# Patient Record
Sex: FEMALE | Race: ASIAN | Employment: FULL TIME | URBAN - METROPOLITAN AREA
[De-identification: names, ages, dates, MRNs, and addresses within clinical notes are randomized per-mention and may not be internally consistent; named-entity substitution may affect disease eponyms.]

---

## 2017-06-02 RX ORDER — HYDROCHLOROTHIAZIDE 12.5 MG/1
CAPSULE, GELATIN COATED ORAL
Qty: 90 CAPSULE | Refills: 0 | Status: SHIPPED | OUTPATIENT
Start: 2017-06-02 | End: 2017-08-31

## 2017-06-02 NOTE — TELEPHONE ENCOUNTER
Medication failed protocol, please approve or deny  LOV- 5/3/2016 over due for follow up   Last labs - 5/6/2016

## 2017-08-31 RX ORDER — HYDROCHLOROTHIAZIDE 12.5 MG/1
CAPSULE, GELATIN COATED ORAL
Qty: 30 CAPSULE | Refills: 0 | Status: SHIPPED | OUTPATIENT
Start: 2017-08-31 | End: 2017-10-11

## 2017-08-31 NOTE — TELEPHONE ENCOUNTER
Medication failed protocol please approve or deny  LOV- 5/3/2016, over due for annual physical  Last labs- 5/3/2016  Medication last refilled- 6/2/2017 x 3 mo  No appointment scheduled

## 2017-10-13 RX ORDER — HYDROCHLOROTHIAZIDE 12.5 MG/1
CAPSULE, GELATIN COATED ORAL
Qty: 30 CAPSULE | Refills: 0 | Status: SHIPPED | OUTPATIENT
Start: 2017-10-13 | End: 2017-11-13

## 2017-10-16 DIAGNOSIS — L40.9 PSORIASIS: ICD-10-CM

## 2017-10-16 RX ORDER — BETAMETHASONE DIPROPIONATE 0.5 MG/G
1 CREAM TOPICAL 2 TIMES DAILY
Qty: 15 G | Refills: 0 | OUTPATIENT
Start: 2017-10-16 | End: 2017-10-30

## 2017-11-13 ENCOUNTER — OFFICE VISIT (OUTPATIENT)
Dept: FAMILY MEDICINE CLINIC | Facility: CLINIC | Age: 47
End: 2017-11-13

## 2017-11-13 VITALS
BODY MASS INDEX: 26.8 KG/M2 | HEART RATE: 68 BPM | TEMPERATURE: 99 F | DIASTOLIC BLOOD PRESSURE: 86 MMHG | HEIGHT: 64 IN | SYSTOLIC BLOOD PRESSURE: 128 MMHG | OXYGEN SATURATION: 99 % | RESPIRATION RATE: 20 BRPM | WEIGHT: 157 LBS

## 2017-11-13 DIAGNOSIS — Z12.31 VISIT FOR SCREENING MAMMOGRAM: ICD-10-CM

## 2017-11-13 DIAGNOSIS — Z00.00 ROUTINE GENERAL MEDICAL EXAMINATION AT A HEALTH CARE FACILITY: Primary | ICD-10-CM

## 2017-11-13 DIAGNOSIS — Z13.29 SCREENING FOR ENDOCRINE, NUTRITIONAL, METABOLIC AND IMMUNITY DISORDER: ICD-10-CM

## 2017-11-13 DIAGNOSIS — N95.2 POST-MENOPAUSE ATROPHIC VAGINITIS: ICD-10-CM

## 2017-11-13 DIAGNOSIS — Z13.21 SCREENING FOR ENDOCRINE, NUTRITIONAL, METABOLIC AND IMMUNITY DISORDER: ICD-10-CM

## 2017-11-13 DIAGNOSIS — I10 ESSENTIAL HYPERTENSION, BENIGN: ICD-10-CM

## 2017-11-13 DIAGNOSIS — L30.8 OTHER ECZEMA: ICD-10-CM

## 2017-11-13 DIAGNOSIS — Z13.0 SCREENING FOR ENDOCRINE, NUTRITIONAL, METABOLIC AND IMMUNITY DISORDER: ICD-10-CM

## 2017-11-13 DIAGNOSIS — Z13.228 SCREENING FOR ENDOCRINE, NUTRITIONAL, METABOLIC AND IMMUNITY DISORDER: ICD-10-CM

## 2017-11-13 PROCEDURE — 99396 PREV VISIT EST AGE 40-64: CPT | Performed by: FAMILY MEDICINE

## 2017-11-13 PROCEDURE — 99213 OFFICE O/P EST LOW 20 MIN: CPT | Performed by: FAMILY MEDICINE

## 2017-11-13 RX ORDER — BETAMETHASONE DIPROPIONATE 0.5 MG/G
OINTMENT TOPICAL
Qty: 50 G | Refills: 1 | Status: SHIPPED | OUTPATIENT
Start: 2017-11-13 | End: 2017-11-15

## 2017-11-13 RX ORDER — ASPIRIN 81 MG/1
81 TABLET ORAL DAILY
Qty: 30 TABLET | Refills: 11 | COMMUNITY
Start: 2017-11-13 | End: 2017-11-15

## 2017-11-13 RX ORDER — HYDROCHLOROTHIAZIDE 12.5 MG/1
12.5 CAPSULE, GELATIN COATED ORAL
Qty: 90 CAPSULE | Refills: 3 | Status: SHIPPED | OUTPATIENT
Start: 2017-11-13 | End: 2017-11-15

## 2017-11-13 NOTE — PATIENT INSTRUCTIONS
Dr. Bekah Rodriguez DDS.    Address: [facility], [address]   Phone: [phone]      Taking a Diuretic  Your healthcare provider has prescribed a diuretic, or “water pill,” to help your body get rid of excess water and salt and maint · Get up slowly when you are sitting or lying down. This helps prevent dizziness and falls due to dizziness. · Ask your healthcare provider or pharmacist before you take any other prescription or nonprescription medicine or herbal supplements.  Some of the © 4008-5676 [employer] [address]. [address] [facility], [address]. All rights reserved. This information is not intended as a substitute for professional medical care. Always follow your healthcare professional's instructions.         Eating · Managing calories. A calorie is a unit of energy. Your body burns calories for fuel, but if you eat more calories than your body burns, the extras are stored as fat. Your health care provider can help you create a diet plan to manage your calories.  This A mckeon part of healthy cooking is cutting down on added fat and salt. Look on the internet for lower-fat, lower-sodium recipes. Also, try these tips:  · Remove fat from meat and skin from poultry before cooking.   · Skim fat from the surface of soups and jean-pierre

## 2017-11-13 NOTE — PROGRESS NOTES
Dipak Martinez is a 48year old female who presents for a complete physical exam, no gyn. HPI:     Patient presents with:  Physical      Patient feels well, dental visit up to date, no hearing problem. Vaccinations up to date.     The patient complaint edema,orthopnea, no palpitations   GI: denies nausea, vomiting, constipation, diarrhea; no rectal bleeding; no heartburn  GENITAL/: no dysuria, urgency or frequency  MUSCULOSKELETAL: no joint complaints upper or lower extremities  NEURO: no sensory or mo symptoms:    Signed Prescriptions Disp Refills    Estrogens, Conjugated 0.625 MG/GM Vaginal Cream 1 Tube 6      Sig: Place 0.5 g vaginally 3 (three) times a week.          All side effects of HRT (increase risk of uterine and breast cancer) also  benefits o

## 2017-11-15 ENCOUNTER — TELEPHONE (OUTPATIENT)
Dept: FAMILY MEDICINE CLINIC | Facility: CLINIC | Age: 47
End: 2017-11-15

## 2017-11-15 DIAGNOSIS — I10 ESSENTIAL HYPERTENSION, BENIGN: ICD-10-CM

## 2017-11-15 RX ORDER — BETAMETHASONE DIPROPIONATE 0.5 MG/G
OINTMENT TOPICAL
Qty: 50 G | Refills: 1 | Status: SHIPPED | OUTPATIENT
Start: 2017-11-15 | End: 2018-12-14 | Stop reason: ALTCHOICE

## 2017-11-15 RX ORDER — ASPIRIN 81 MG/1
81 TABLET ORAL DAILY
Qty: 90 TABLET | Refills: 3 | Status: SHIPPED | OUTPATIENT
Start: 2017-11-15 | End: 2018-12-14

## 2017-11-15 RX ORDER — HYDROCHLOROTHIAZIDE 12.5 MG/1
12.5 CAPSULE, GELATIN COATED ORAL
Qty: 90 CAPSULE | Refills: 3 | Status: SHIPPED | OUTPATIENT
Start: 2017-11-15 | End: 2018-12-13

## 2017-11-15 NOTE — TELEPHONE ENCOUNTER
Pt needs refill of Hydrochlorothiazide 12.5,  Estrogens, Betamethasone dip, aspirin transferred to [address] [place] [address] Adin Ph: [phone].

## 2018-01-18 ENCOUNTER — OFFICE VISIT (OUTPATIENT)
Dept: FAMILY MEDICINE CLINIC | Facility: CLINIC | Age: 48
End: 2018-01-18

## 2018-01-18 VITALS
WEIGHT: 160 LBS | TEMPERATURE: 98 F | SYSTOLIC BLOOD PRESSURE: 130 MMHG | BODY MASS INDEX: 27 KG/M2 | OXYGEN SATURATION: 97 % | RESPIRATION RATE: 18 BRPM | DIASTOLIC BLOOD PRESSURE: 60 MMHG | HEART RATE: 87 BPM

## 2018-01-18 DIAGNOSIS — J01.00 ACUTE NON-RECURRENT MAXILLARY SINUSITIS: Primary | ICD-10-CM

## 2018-01-18 PROCEDURE — 99213 OFFICE O/P EST LOW 20 MIN: CPT | Performed by: FAMILY MEDICINE

## 2018-01-18 RX ORDER — AZITHROMYCIN 250 MG/1
TABLET, FILM COATED ORAL
Qty: 6 TABLET | Refills: 0 | Status: SHIPPED | OUTPATIENT
Start: 2018-01-18 | End: 2018-12-14 | Stop reason: ALTCHOICE

## 2018-01-18 NOTE — PROGRESS NOTES
Aicha Pierre is a 50year old female.    Patient presents with:  Sore Throat: pt c\o o fsore thhroat,headache  x3DAYS     HPI:    Symptoms started   7 days ago with purulent nasal discharge, maxillary sinus pressure, and headache, a lot of  post-nasal PLAN:       (J01.00) Acute non-recurrent maxillary sinusitis  (primary encounter diagnosis)  Plan:     Signed Prescriptions Disp Refills    azithromycin (ZITHROMAX Z-ENEDINA) 250 MG Oral Tab 6 tablet 0      Sig: Take two tablets by mouth today, then one tablet

## 2018-12-12 ENCOUNTER — TELEPHONE (OUTPATIENT)
Dept: FAMILY MEDICINE CLINIC | Facility: CLINIC | Age: 48
End: 2018-12-12

## 2018-12-12 DIAGNOSIS — I10 ESSENTIAL HYPERTENSION, BENIGN: ICD-10-CM

## 2018-12-12 RX ORDER — HYDROCHLOROTHIAZIDE 12.5 MG/1
12.5 CAPSULE, GELATIN COATED ORAL
Qty: 90 CAPSULE | Refills: 0 | OUTPATIENT
Start: 2018-12-12

## 2018-12-12 NOTE — TELEPHONE ENCOUNTER
Medication(s) to Refill:   Requested Prescriptions     Pending Prescriptions Disp Refills   • HYDROCHLOROTHIAZIDE 12.5 MG Oral Cap [Pharmacy Med Name: HYDROCHLOROTHIAZIDE 12.5MG 12.5MG C] 90 capsule 0     Sig: TAKE 1 CAPSULE (12.5 MG TOTAL) BY MOUTH ONCE D

## 2018-12-13 RX ORDER — HYDROCHLOROTHIAZIDE 12.5 MG/1
12.5 CAPSULE, GELATIN COATED ORAL
Qty: 30 CAPSULE | Refills: 0 | Status: SHIPPED | OUTPATIENT
Start: 2018-12-13 | End: 2018-12-14

## 2018-12-13 NOTE — TELEPHONE ENCOUNTER
Patient made appointment for 12/14/18 with Ngoc Vaughan for her physical and mediciton follow up. Patient has been out of medication since yesterday.

## 2018-12-14 ENCOUNTER — OFFICE VISIT (OUTPATIENT)
Dept: FAMILY MEDICINE CLINIC | Facility: CLINIC | Age: 48
End: 2018-12-14
Payer: COMMERCIAL

## 2018-12-14 ENCOUNTER — LAB ENCOUNTER (OUTPATIENT)
Dept: LAB | Age: 48
End: 2018-12-14
Attending: NURSE PRACTITIONER
Payer: COMMERCIAL

## 2018-12-14 VITALS
WEIGHT: 148 LBS | HEART RATE: 62 BPM | DIASTOLIC BLOOD PRESSURE: 68 MMHG | RESPIRATION RATE: 16 BRPM | SYSTOLIC BLOOD PRESSURE: 114 MMHG | OXYGEN SATURATION: 99 % | HEIGHT: 64 IN | TEMPERATURE: 98 F | BODY MASS INDEX: 25.27 KG/M2

## 2018-12-14 DIAGNOSIS — L72.3 SEBACEOUS CYST: ICD-10-CM

## 2018-12-14 DIAGNOSIS — L30.9 ECZEMA, UNSPECIFIED TYPE: ICD-10-CM

## 2018-12-14 DIAGNOSIS — Z13.0 SCREENING FOR ENDOCRINE, NUTRITIONAL, METABOLIC AND IMMUNITY DISORDER: ICD-10-CM

## 2018-12-14 DIAGNOSIS — Z13.21 ENCOUNTER FOR VITAMIN DEFICIENCY SCREENING: ICD-10-CM

## 2018-12-14 DIAGNOSIS — Z13.228 SCREENING FOR ENDOCRINE, NUTRITIONAL, METABOLIC AND IMMUNITY DISORDER: ICD-10-CM

## 2018-12-14 DIAGNOSIS — I10 ESSENTIAL HYPERTENSION, BENIGN: ICD-10-CM

## 2018-12-14 DIAGNOSIS — Z13.6 ENCOUNTER FOR SCREENING FOR CARDIOVASCULAR DISORDERS: ICD-10-CM

## 2018-12-14 DIAGNOSIS — Z13.21 SCREENING FOR ENDOCRINE, NUTRITIONAL, METABOLIC AND IMMUNITY DISORDER: ICD-10-CM

## 2018-12-14 DIAGNOSIS — Z00.00 ROUTINE GENERAL MEDICAL EXAMINATION AT A HEALTH CARE FACILITY: Primary | ICD-10-CM

## 2018-12-14 DIAGNOSIS — Z13.29 SCREENING FOR ENDOCRINE, NUTRITIONAL, METABOLIC AND IMMUNITY DISORDER: ICD-10-CM

## 2018-12-14 DIAGNOSIS — Z12.39 SCREENING FOR BREAST CANCER: ICD-10-CM

## 2018-12-14 PROCEDURE — 36415 COLL VENOUS BLD VENIPUNCTURE: CPT | Performed by: NURSE PRACTITIONER

## 2018-12-14 PROCEDURE — 80061 LIPID PANEL: CPT | Performed by: NURSE PRACTITIONER

## 2018-12-14 PROCEDURE — 99396 PREV VISIT EST AGE 40-64: CPT | Performed by: NURSE PRACTITIONER

## 2018-12-14 PROCEDURE — 82306 VITAMIN D 25 HYDROXY: CPT | Performed by: NURSE PRACTITIONER

## 2018-12-14 PROCEDURE — 99213 OFFICE O/P EST LOW 20 MIN: CPT | Performed by: NURSE PRACTITIONER

## 2018-12-14 PROCEDURE — 80050 GENERAL HEALTH PANEL: CPT | Performed by: NURSE PRACTITIONER

## 2018-12-14 RX ORDER — ASPIRIN 81 MG/1
81 TABLET ORAL DAILY
Qty: 90 TABLET | Refills: 3 | Status: SHIPPED | OUTPATIENT
Start: 2018-12-14 | End: 2019-02-02

## 2018-12-14 RX ORDER — CEPHALEXIN 500 MG/1
500 CAPSULE ORAL 3 TIMES DAILY
Qty: 21 CAPSULE | Refills: 0 | Status: SHIPPED | OUTPATIENT
Start: 2018-12-14 | End: 2018-12-21

## 2018-12-14 RX ORDER — BETAMETHASONE DIPROPIONATE 0.5 MG/G
OINTMENT TOPICAL
Qty: 50 G | Refills: 1 | Status: SHIPPED | OUTPATIENT
Start: 2018-12-14

## 2018-12-14 RX ORDER — HYDROCHLOROTHIAZIDE 12.5 MG/1
12.5 CAPSULE, GELATIN COATED ORAL
Qty: 90 CAPSULE | Refills: 2 | Status: SHIPPED | OUTPATIENT
Start: 2018-12-14 | End: 2019-02-07 | Stop reason: CLARIF

## 2018-12-14 NOTE — PROGRESS NOTES
[facility]   PROGRESS NOTE  Chief Complaint:   Patient presents with:  Physical      HPI:   This is a 50year old female coming in for physical     Physical: Patient reports doing well, blood pressure under control , exercising three times  per w 150.0 - 450.0 10(3)uL    MCV 89.5 81.0 - 100.0 fL    MCH 28.7 27.0 - 33.2 pg    MCHC 32.1 31.0 - 37.0 g/dL    RDW 14.1 11.5 - 16.0 %    RDW-SD 45.4 35.1 - 46.3 fL    Neutrophil Absolute Prelim 2.73 1.30 - 6.70 x10 (3) uL    Neutrophil Absolute 2.73 1.30 - sputum. GASTROINTESTINAL:  Denies abdominal pain, nausea, vomiting, constipation, diarrhea  MUSCULOSKELETAL:  Denies weakness, muscle aches, back pain, joint pain, swelling or stiffness.   NEUROLOGICAL:  Denies headache, seizures, dizziness, HEMATOLOGIC: that consists of whole grain , fruits , vegetables , lean meats . Aim for higher servings of fruits and vegetables and less fatty foods in your diet.        Screening for endocrine, nutritional, metabolic and immunity disorder  -     CBC WITH DIFFERENTIAL W or complications from the treatments as a result of today.      Problem List:  Patient Active Problem List:     Routine gynecological examination     Screening for iron deficiency anemia     Screening for lipoid disorders     Screening for thyroid disorder

## 2018-12-17 ENCOUNTER — TELEPHONE (OUTPATIENT)
Dept: FAMILY MEDICINE CLINIC | Facility: CLINIC | Age: 48
End: 2018-12-17

## 2018-12-17 NOTE — TELEPHONE ENCOUNTER
----- Message from CLARICE Infante sent at 12/15/2018 10:22 AM CST -----  Labs are good , cholesterol slightly elevated -continue low cholesterol, low fat diet , exercise

## 2019-02-02 DIAGNOSIS — I10 ESSENTIAL HYPERTENSION, BENIGN: ICD-10-CM

## 2019-02-04 RX ORDER — ASPIRIN 81 MG/1
81 TABLET ORAL DAILY
Qty: 90 TABLET | Refills: 3 | Status: SHIPPED | OUTPATIENT
Start: 2019-02-04

## 2019-02-04 NOTE — TELEPHONE ENCOUNTER
Medication(s) to Refill:   Requested Prescriptions     Pending Prescriptions Disp Refills   • aspirin 81 MG Oral Tab EC 90 tablet 3     Sig: Take 1 tablet (81 mg total) by mouth daily.          Reason for Medication Refill being sent to Provider / Reason Pr

## 2019-02-07 ENCOUNTER — OFFICE VISIT (OUTPATIENT)
Dept: FAMILY MEDICINE CLINIC | Facility: CLINIC | Age: 49
End: 2019-02-07
Payer: COMMERCIAL

## 2019-02-07 VITALS
BODY MASS INDEX: 24.59 KG/M2 | SYSTOLIC BLOOD PRESSURE: 128 MMHG | WEIGHT: 144 LBS | HEIGHT: 64 IN | RESPIRATION RATE: 16 BRPM | DIASTOLIC BLOOD PRESSURE: 86 MMHG | HEART RATE: 88 BPM | OXYGEN SATURATION: 97 % | TEMPERATURE: 98 F

## 2019-02-07 DIAGNOSIS — Z12.11 SCREENING FOR COLON CANCER: ICD-10-CM

## 2019-02-07 DIAGNOSIS — I10 ESSENTIAL HYPERTENSION: Primary | ICD-10-CM

## 2019-02-07 PROCEDURE — 99214 OFFICE O/P EST MOD 30 MIN: CPT | Performed by: FAMILY MEDICINE

## 2019-02-07 RX ORDER — LOSARTAN POTASSIUM AND HYDROCHLOROTHIAZIDE 12.5; 5 MG/1; MG/1
1 TABLET ORAL DAILY
Qty: 90 TABLET | Refills: 3 | Status: SHIPPED | OUTPATIENT
Start: 2019-02-07 | End: 2019-12-27

## 2019-02-07 NOTE — PROGRESS NOTES
Sirisha Dawkins is a 46year old female presents with HTN.     HPI:   Patient presents for high blood pressure issues. Pt has been not taking any medications, home BP monitoring in the range of  535-899  's systolic and  90 's diastolic. Pt took Phentermine edema.  GI: denies abdominal pain and denies heartburn  NEURO: denies abnormal sensation. EXAM:   /86   Pulse 88   Temp 98.1 °F (36.7 °C) (Oral)   Resp 16   Ht 64\"   Wt 144 lb   SpO2 97%   BMI 24.72 kg/m²   GEN: Pt A, Ox3, NAD, pleasant.   EYES: P

## 2019-02-07 NOTE — PATIENT INSTRUCTIONS
[facility] General Surgery:       Dr. Dollene Mohs    [address] [place] [address]  Rafi, [address]      [address]  [address]

## 2019-08-08 ENCOUNTER — TELEPHONE (OUTPATIENT)
Dept: FAMILY MEDICINE CLINIC | Facility: CLINIC | Age: 49
End: 2019-08-08

## 2019-08-08 NOTE — TELEPHONE ENCOUNTER
Patient's spouse Sharmila Wills dropped off [facility] healthworks/[employer] wellness program screening form for completion, explained to Sharmila Wills that an OSCAR would need to be completed by patient before this form could be faxed, he said to call [place]

## 2019-08-12 NOTE — TELEPHONE ENCOUNTER
Spoke with patient and informed her physical form is ready for . Patient states her  will  form. Copy sent to scan.

## 2019-08-13 ENCOUNTER — MED REC SCAN ONLY (OUTPATIENT)
Dept: FAMILY MEDICINE CLINIC | Facility: CLINIC | Age: 49
End: 2019-08-13

## 2019-08-22 ENCOUNTER — OFFICE VISIT (OUTPATIENT)
Dept: FAMILY MEDICINE CLINIC | Facility: CLINIC | Age: 49
End: 2019-08-22
Payer: COMMERCIAL

## 2019-08-22 VITALS
DIASTOLIC BLOOD PRESSURE: 80 MMHG | RESPIRATION RATE: 16 BRPM | HEIGHT: 64 IN | WEIGHT: 157 LBS | SYSTOLIC BLOOD PRESSURE: 110 MMHG | BODY MASS INDEX: 26.8 KG/M2 | OXYGEN SATURATION: 98 % | HEART RATE: 76 BPM

## 2019-08-22 DIAGNOSIS — Z12.4 SCREENING FOR CERVICAL CANCER: Primary | ICD-10-CM

## 2019-08-22 PROCEDURE — 99396 PREV VISIT EST AGE 40-64: CPT | Performed by: NURSE PRACTITIONER

## 2019-08-22 PROCEDURE — 88175 CYTOPATH C/V AUTO FLUID REDO: CPT | Performed by: NURSE PRACTITIONER

## 2019-08-22 PROCEDURE — 87624 HPV HI-RISK TYP POOLED RSLT: CPT | Performed by: NURSE PRACTITIONER

## 2019-08-22 NOTE — PROGRESS NOTES
CHIEF COMPLAINT:         HPI:   Alfredo Moreland is a 46year old female who presents for a complete physical exam.     LMP: 3 years ago. Menopause.       Last Pap: 25 y result: normal  Sexually active: yes -decreased due to menopause  Contraception us allergies or asthma    EXAM:   /80   Pulse 76   Resp 16   Ht 64\"   Wt 157 lb   SpO2 98%   BMI 26.95 kg/m²   Body mass index is 26.95 kg/m².      GENERAL: well developed, in no apparent distress  SKIN: no rashes  HEENT: atraumatic, normocephalic,ears

## 2019-08-22 NOTE — PATIENT INSTRUCTIONS
Pap Test     A speculum is used to open the vagina so cells can be taken from the cervix. Schedule your test for a time when you will not be having your menstrual period.  If you’re menstruating at the time of your appointment, call your healthcare prov · Normal result. The cells in the sample appear healthy. Have your next Pap test as recommended by your healthcare provider. · Abnormal result. The lab saw something unusual in your sample. Talk with your healthcare provider about what the results mean.  Chel Elise

## 2019-08-23 LAB — HPV I/H RISK 1 DNA SPEC QL NAA+PROBE: NEGATIVE

## 2019-08-27 LAB — LAST PAP RESULT: NORMAL

## 2019-09-03 ENCOUNTER — HOSPITAL ENCOUNTER (OUTPATIENT)
Dept: MAMMOGRAPHY | Age: 49
Discharge: HOME | End: 2019-09-03
Attending: NURSE PRACTITIONER
Payer: COMMERCIAL

## 2019-09-03 ENCOUNTER — OFFICE VISIT (OUTPATIENT)
Dept: SURGERY | Facility: CLINIC | Age: 49
End: 2019-09-03

## 2019-09-03 VITALS
HEIGHT: 64 IN | DIASTOLIC BLOOD PRESSURE: 81 MMHG | HEART RATE: 67 BPM | BODY MASS INDEX: 27.31 KG/M2 | SYSTOLIC BLOOD PRESSURE: 120 MMHG | WEIGHT: 160 LBS | RESPIRATION RATE: 18 BRPM

## 2019-09-03 DIAGNOSIS — I10 ESSENTIAL HYPERTENSION, BENIGN: ICD-10-CM

## 2019-09-03 DIAGNOSIS — Z12.11 ENCOUNTER FOR SCREENING COLONOSCOPY: Primary | ICD-10-CM

## 2019-09-03 DIAGNOSIS — Z12.39 SCREENING FOR BREAST CANCER: ICD-10-CM

## 2019-09-03 PROCEDURE — 77063 BREAST TOMOSYNTHESIS BI: CPT | Performed by: NURSE PRACTITIONER

## 2019-09-03 PROCEDURE — 77067 SCR MAMMO BI INCL CAD: CPT | Performed by: NURSE PRACTITIONER

## 2019-09-03 PROCEDURE — S0285 CNSLT BEFORE SCREEN COLONOSC: HCPCS | Performed by: COLON & RECTAL SURGERY

## 2019-09-03 RX ORDER — POLYETHYLENE GLYCOL 3350, SODIUM CHLORIDE, SODIUM BICARBONATE, POTASSIUM CHLORIDE 420; 11.2; 5.72; 1.48 G/4L; G/4L; G/4L; G/4L
POWDER, FOR SOLUTION ORAL
Qty: 1 BOTTLE | Refills: 0 | Status: SHIPPED | OUTPATIENT
Start: 2019-09-03 | End: 2021-07-09 | Stop reason: ALTCHOICE

## 2019-09-03 NOTE — H&P
New Patient Visit Note       Active Problems      1.  Encounter for screening colonoscopy        Chief Complaint   Patient presents with:  Colonoscopy: NEW PT, COLONOSCOPY CONSULT-pt deniers family hx of colon cancer, denies rectal bleeding, or other symtom listed diagnoses and treatment options. Allergies  Teri has No Known Allergies. Past Medical / Surgical / Social / Family History    The past medical and past surgical history have been reviewed by me today.     Past Medical History:   Diagnosis Merrill Eyes: Pupils are equal, round, and reactive to light. Conjunctivae are normal. No scleral icterus. Neck: Trachea normal and normal range of motion. Neck supple. No JVD present. Carotid bruit is not present. No tracheal deviation present.  No thyroid mas and dry. She is not diaphoretic. Psychiatric: She has a normal mood and affect. Her behavior is normal. Thought content normal.   Nursing note and vitals reviewed.           Assessment   Encounter for screening colonoscopy  (primary encounter diagnosis)

## 2019-09-03 NOTE — PATIENT INSTRUCTIONS
I am seeing this patient at the request the primary care service in consultation regarding colonoscopy. This patient has a grandmother who has had cancer of the uterus. She has no blood in the stool or melena.   She has no mucus in the stool or narrow

## 2019-09-16 ENCOUNTER — MED REC SCAN ONLY (OUTPATIENT)
Dept: FAMILY MEDICINE CLINIC | Facility: CLINIC | Age: 49
End: 2019-09-16

## 2019-10-03 ENCOUNTER — PATIENT MESSAGE (OUTPATIENT)
Dept: FAMILY MEDICINE CLINIC | Facility: CLINIC | Age: 49
End: 2019-10-03

## 2019-10-03 ENCOUNTER — TELEPHONE (OUTPATIENT)
Dept: FAMILY MEDICINE CLINIC | Facility: CLINIC | Age: 49
End: 2019-10-03

## 2019-10-03 NOTE — TELEPHONE ENCOUNTER
From: Mehul Gomez  To: Jesse Tai MD  Sent: 10/3/2019 10:26 AM CDT  Subject: Test Results Question    the following test results were requested by [facility] ,please send to them as soon as possible,it was due 9/30/2019 and I thought the request was s

## 2019-10-03 NOTE — TELEPHONE ENCOUNTER
Patient is looking for results of her cologard, she needs to submit a copy of it to her insurance. Pt will be sending a My Chart message with fax info, she indicated that she has already signed OSCAR for that.

## 2019-10-03 NOTE — TELEPHONE ENCOUNTER
From: Papa Ingram  To:  CLARICE Kelly  Sent: 10/3/2019 9:51 AM CDT  Subject: Test Results Question    I need to submit ALL wellness test results to Jack ,I have submitted my cologuard 9/11/2019 and donot see the test results ,please send me a co

## 2019-10-03 NOTE — TELEPHONE ENCOUNTER
From: Radha Armstrong  To:  CLARICE Ken  Sent: 10/3/2019 10:28 AM CDT  Subject: Test Results Question    the following test results were requested by [facility] ,please send to them as soon as possible,it was due 9/30/2019 and I thought the request was

## 2019-10-28 ENCOUNTER — PATIENT MESSAGE (OUTPATIENT)
Dept: FAMILY MEDICINE CLINIC | Facility: CLINIC | Age: 49
End: 2019-10-28

## 2019-10-28 ENCOUNTER — TELEPHONE (OUTPATIENT)
Dept: FAMILY MEDICINE CLINIC | Facility: CLINIC | Age: 49
End: 2019-10-28

## 2019-10-28 NOTE — TELEPHONE ENCOUNTER
Patient is wondering if we received a form from [facility] \[facility]\"" to fill out with her physical info for insurance.

## 2019-10-29 NOTE — TELEPHONE ENCOUNTER
From: Masood Brewer  To:  Amanda Méndez MD  Sent: 10/28/2019 8:26 PM CDT  Subject: Visit Follow-up Question    i have attached the form needed by [facility] ,they are saying they have NOT received it and will kick me out of insurance on 11/ if they do

## 2019-10-29 NOTE — TELEPHONE ENCOUNTER
From: Alf Cleaning  To: CLARICE Regalado  Sent: 10/28/2019 8:40 PM CDT  Subject: Visit Follow-up Question    have attached the form needed by [facility] ,they are saying they have NOT received it and will kick me out of insurance on 11/ if they don

## 2019-11-08 ENCOUNTER — LAB ENCOUNTER (OUTPATIENT)
Dept: LAB | Age: 49
End: 2019-11-08
Attending: FAMILY MEDICINE
Payer: COMMERCIAL

## 2019-11-08 ENCOUNTER — OFFICE VISIT (OUTPATIENT)
Dept: FAMILY MEDICINE CLINIC | Facility: CLINIC | Age: 49
End: 2019-11-08
Payer: COMMERCIAL

## 2019-11-08 VITALS
OXYGEN SATURATION: 99 % | TEMPERATURE: 99 F | SYSTOLIC BLOOD PRESSURE: 126 MMHG | WEIGHT: 153 LBS | RESPIRATION RATE: 20 BRPM | HEART RATE: 68 BPM | BODY MASS INDEX: 26.12 KG/M2 | DIASTOLIC BLOOD PRESSURE: 80 MMHG | HEIGHT: 64 IN

## 2019-11-08 DIAGNOSIS — H81.13 BENIGN PAROXYSMAL POSITIONAL VERTIGO DUE TO BILATERAL VESTIBULAR DISORDER: Primary | ICD-10-CM

## 2019-11-08 DIAGNOSIS — H81.13 BENIGN PAROXYSMAL POSITIONAL VERTIGO DUE TO BILATERAL VESTIBULAR DISORDER: ICD-10-CM

## 2019-11-08 PROCEDURE — 80053 COMPREHEN METABOLIC PANEL: CPT | Performed by: FAMILY MEDICINE

## 2019-11-08 PROCEDURE — 36415 COLL VENOUS BLD VENIPUNCTURE: CPT | Performed by: FAMILY MEDICINE

## 2019-11-08 PROCEDURE — 85025 COMPLETE CBC W/AUTO DIFF WBC: CPT | Performed by: FAMILY MEDICINE

## 2019-11-08 PROCEDURE — 99214 OFFICE O/P EST MOD 30 MIN: CPT | Performed by: FAMILY MEDICINE

## 2019-11-08 RX ORDER — MECLIZINE HCL 12.5 MG/1
12.5 TABLET ORAL 3 TIMES DAILY PRN
Qty: 40 TABLET | Refills: 0 | Status: SHIPPED | OUTPATIENT
Start: 2019-11-08 | End: 2019-11-17

## 2019-11-08 NOTE — PROGRESS NOTES
Patient presents with:  Nausea: Rt Arm pain        HPI:      The patient has complaints of dizziness.  Pt reports vertigo, \"room is spinning around symptoms\",any head movement makes it worse started suddenly:few weeks ago pt took \"whole bottle of Motrin\ soft, NT/ND with no HSM. No rebound or guarding, NABS. Extremities: are symmetric with no cyanosis, clubbing, or edema. Skin: is unremarkable without rashes. Back: has normal curves, no scoliosis.    Lymph: no lymphadenopathy of the neck, axillary

## 2019-11-10 ENCOUNTER — PATIENT MESSAGE (OUTPATIENT)
Dept: FAMILY MEDICINE CLINIC | Facility: CLINIC | Age: 49
End: 2019-11-10

## 2019-11-11 NOTE — TELEPHONE ENCOUNTER
From: Radha Armstrong  To: Eleni Martines MD  Sent: 11/10/2019 6:00 PM CST  Subject: Test Results Question    recvd results of cbc ,what about cmp?  thanks

## 2019-11-17 DIAGNOSIS — H81.13 BENIGN PAROXYSMAL POSITIONAL VERTIGO DUE TO BILATERAL VESTIBULAR DISORDER: ICD-10-CM

## 2019-11-18 RX ORDER — MECLIZINE HCL 12.5 MG/1
TABLET ORAL
Qty: 40 TABLET | Refills: 0 | Status: SHIPPED | OUTPATIENT
Start: 2019-11-18 | End: 2021-01-19

## 2019-11-18 NOTE — TELEPHONE ENCOUNTER
Medication(s) to Refill:   Requested Prescriptions     Pending Prescriptions Disp Refills   • MECLIZINE HCL 12.5 MG Oral Tab [Pharmacy Med Name: Meclizine Hcl 12.5 Mg Tab Rugb] 40 tablet 0     Sig: Take 1 tablet by mouth 3 (three) times daily as needed.

## 2019-12-27 ENCOUNTER — TELEPHONE (OUTPATIENT)
Dept: FAMILY MEDICINE CLINIC | Facility: CLINIC | Age: 49
End: 2019-12-27

## 2019-12-27 ENCOUNTER — PATIENT MESSAGE (OUTPATIENT)
Dept: FAMILY MEDICINE CLINIC | Facility: CLINIC | Age: 49
End: 2019-12-27

## 2019-12-27 RX ORDER — HYDROCHLOROTHIAZIDE 12.5 MG/1
12.5 TABLET ORAL DAILY
Qty: 90 TABLET | Refills: 1 | Status: SHIPPED | OUTPATIENT
Start: 2019-12-27 | End: 2020-05-21

## 2019-12-27 RX ORDER — LOSARTAN POTASSIUM 50 MG/1
50 TABLET ORAL DAILY
Qty: 90 TABLET | Refills: 1 | Status: SHIPPED | OUTPATIENT
Start: 2019-12-27 | End: 2020-05-21

## 2019-12-27 NOTE — TELEPHONE ENCOUNTER
Received fax from [facility] that the losartan-hctz 50/12.5mg is still on back order so if the script could be sent as 2 separate scripts.     completed

## 2019-12-30 RX ORDER — OSELTAMIVIR PHOSPHATE 75 MG/1
75 CAPSULE ORAL 2 TIMES DAILY
Qty: 10 CAPSULE | Refills: 0 | Status: SHIPPED | OUTPATIENT
Start: 2019-12-30 | End: 2020-01-04

## 2019-12-30 NOTE — TELEPHONE ENCOUNTER
Please see pt e-mail. Pt's symptoms started 12/27. LOV 11/8/19. Please advise on Tamiflu orders for pt.

## 2019-12-30 NOTE — TELEPHONE ENCOUNTER
From: Mehul Gomez  To:  Rehana Encinas MD  Sent: 12/27/2019 8:49 PM CST  Subject: Prescription Question    My  has the flu  And saw Dr Hermann Casarez yesterday   He’s on tamiflu  My son Yuliet Cisse and daughter Carina Reese are still coughing they had the fl

## 2020-05-03 ENCOUNTER — PATIENT MESSAGE (OUTPATIENT)
Dept: FAMILY MEDICINE CLINIC | Facility: CLINIC | Age: 50
End: 2020-05-03

## 2020-05-04 ENCOUNTER — VIRTUAL PHONE E/M (OUTPATIENT)
Dept: FAMILY MEDICINE CLINIC | Facility: CLINIC | Age: 50
End: 2020-05-04
Payer: COMMERCIAL

## 2020-05-04 DIAGNOSIS — Z20.822 CLOSE EXPOSURE TO COVID-19 VIRUS: Primary | ICD-10-CM

## 2020-05-04 PROCEDURE — 99213 OFFICE O/P EST LOW 20 MIN: CPT | Performed by: FAMILY MEDICINE

## 2020-05-04 NOTE — PROGRESS NOTES
HPI:    Patient ID: Mehul Gomez is a 44year old female. Mehul Gomez verbally consents to a Virtual/Telephone Check-In service on 05/04/20. CC: Exposured to Covid 19.     HPI  Pt exposed at work, no issues, no fever, no chills, no coush, no SO

## 2020-05-04 NOTE — TELEPHONE ENCOUNTER
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 I got over the extreme tiredness last thursday.  No coughing no fever no diarrhea no o

## 2020-05-19 RX ORDER — LOSARTAN POTASSIUM 50 MG/1
50 TABLET ORAL DAILY
Qty: 90 TABLET | Refills: 1 | OUTPATIENT
Start: 2020-05-19

## 2020-05-19 RX ORDER — HYDROCHLOROTHIAZIDE 12.5 MG/1
12.5 TABLET ORAL DAILY
Qty: 90 TABLET | Refills: 1 | OUTPATIENT
Start: 2020-05-19

## 2020-05-21 RX ORDER — LOSARTAN POTASSIUM 50 MG/1
50 TABLET ORAL DAILY
Qty: 90 TABLET | Refills: 1 | Status: SHIPPED | OUTPATIENT
Start: 2020-05-21 | End: 2020-12-22

## 2020-05-21 RX ORDER — HYDROCHLOROTHIAZIDE 12.5 MG/1
12.5 TABLET ORAL DAILY
Qty: 90 TABLET | Refills: 1 | Status: SHIPPED | OUTPATIENT
Start: 2020-05-21 | End: 2020-12-22

## 2020-05-21 NOTE — TELEPHONE ENCOUNTER
Pharmacy is requesting refill, they did not received script that was sent 12/27/19.  Please approve or deny

## 2020-06-26 ENCOUNTER — PATIENT MESSAGE (OUTPATIENT)
Dept: FAMILY MEDICINE CLINIC | Facility: CLINIC | Age: 50
End: 2020-06-26

## 2020-06-26 RX ORDER — ACETAMINOPHEN AND CODEINE PHOSPHATE 300; 30 MG/1; MG/1
1-2 TABLET ORAL EVERY 4 HOURS PRN
Qty: 20 TABLET | Refills: 0 | Status: SHIPPED | OUTPATIENT
Start: 2020-06-26 | End: 2020-07-06

## 2020-06-26 RX ORDER — CHLORHEXIDINE GLUCONATE 0.12 MG/ML
15 RINSE ORAL 2 TIMES DAILY
Qty: 473 ML | Refills: 0 | Status: SHIPPED | OUTPATIENT
Start: 2020-06-26 | End: 2020-07-10

## 2020-06-26 NOTE — TELEPHONE ENCOUNTER
Please see pt e-mail. LOV 5/4 virtual for Close COVID Exposure without symptoms. Please advise on orders for pt.

## 2020-06-26 NOTE — TELEPHONE ENCOUNTER
From: Brian Garcia  To:  Shelly Jordan MD  Sent: 6/26/2020 9:46 AM CDT  Subject: Other    I have a canker sore at the side of my tongue towards my right cheek  Very close to my throat   I think there us more than one i cannot see it but it us causing

## 2020-06-26 NOTE — TELEPHONE ENCOUNTER
Augustus Gipson MD  You 2 minutes ago (10:27 AM)      We can send Tylenol 3 and some solution, I can do it, let her knwo if she is interested

## 2020-08-26 ENCOUNTER — PATIENT MESSAGE (OUTPATIENT)
Dept: FAMILY MEDICINE CLINIC | Facility: CLINIC | Age: 50
End: 2020-08-26

## 2020-08-26 NOTE — TELEPHONE ENCOUNTER
From: Sirisha Dawkins  To: Hemal Dickson MD  Sent: 8/26/2020 8:51 AM CDT  Subject: Other    continued-  prophylactic antibiotics ? he is otherwise healthy,prepares his own meals ,he exercises daily and is into fitness .   thank you ,i would appreciate fe

## 2020-12-22 RX ORDER — HYDROCHLOROTHIAZIDE 12.5 MG/1
12.5 TABLET ORAL DAILY
Qty: 90 TABLET | Refills: 0 | Status: SHIPPED | OUTPATIENT
Start: 2020-12-22 | End: 2021-01-19

## 2020-12-22 RX ORDER — LOSARTAN POTASSIUM 50 MG/1
50 TABLET ORAL DAILY
Qty: 90 TABLET | Refills: 0 | Status: SHIPPED | OUTPATIENT
Start: 2020-12-22 | End: 2021-01-19

## 2020-12-22 NOTE — TELEPHONE ENCOUNTER
Medication(s) to Refill:   Requested Prescriptions     Pending Prescriptions Disp Refills   • HYDROCHLOROTHIAZIDE 12.5 MG Oral Tab [Pharmacy Med Name: Hydrochlorothiazide 12.5 Mg Tab Acco] 90 tablet 0     Sig: Take 1 tablet (12.5 mg total) by mouth daily.

## 2021-01-19 DIAGNOSIS — H81.13 BENIGN PAROXYSMAL POSITIONAL VERTIGO DUE TO BILATERAL VESTIBULAR DISORDER: ICD-10-CM

## 2021-01-20 RX ORDER — MECLIZINE HCL 12.5 MG/1
12.5 TABLET ORAL 3 TIMES DAILY PRN
Qty: 40 TABLET | Refills: 0 | Status: SHIPPED | OUTPATIENT
Start: 2021-01-20 | End: 2021-07-09

## 2021-01-20 RX ORDER — HYDROCHLOROTHIAZIDE 12.5 MG/1
12.5 TABLET ORAL DAILY
Qty: 90 TABLET | Refills: 0 | Status: SHIPPED | OUTPATIENT
Start: 2021-01-20 | End: 2021-07-02

## 2021-01-20 RX ORDER — LOSARTAN POTASSIUM 50 MG/1
50 TABLET ORAL DAILY
Qty: 90 TABLET | Refills: 0 | Status: SHIPPED | OUTPATIENT
Start: 2021-01-20 | End: 2021-07-02

## 2021-01-20 NOTE — TELEPHONE ENCOUNTER
Medication(s) to Refill:   Requested Prescriptions     Pending Prescriptions Disp Refills   • Meclizine HCl 12.5 MG Oral Tab 40 tablet 0     Sig: Take 1 tablet (12.5 mg total) by mouth 3 (three) times daily as needed.    • hydrochlorothiazide 12.5 MG Oral T

## 2021-01-22 ENCOUNTER — TELEMEDICINE (OUTPATIENT)
Dept: FAMILY MEDICINE CLINIC | Facility: CLINIC | Age: 51
End: 2021-01-22

## 2021-01-22 ENCOUNTER — PATIENT MESSAGE (OUTPATIENT)
Dept: FAMILY MEDICINE CLINIC | Facility: CLINIC | Age: 51
End: 2021-01-22

## 2021-01-22 ENCOUNTER — LAB ENCOUNTER (OUTPATIENT)
Dept: LAB | Age: 51
End: 2021-01-22
Attending: FAMILY MEDICINE
Payer: COMMERCIAL

## 2021-01-22 DIAGNOSIS — Z12.31 VISIT FOR SCREENING MAMMOGRAM: ICD-10-CM

## 2021-01-22 DIAGNOSIS — R10.13 EPIGASTRIC PAIN: Primary | ICD-10-CM

## 2021-01-22 DIAGNOSIS — R10.13 EPIGASTRIC PAIN: ICD-10-CM

## 2021-01-22 LAB
ALBUMIN SERPL-MCNC: 4.3 G/DL (ref 3.4–5)
ALBUMIN/GLOB SERPL: 1 {RATIO} (ref 1–2)
ALP LIVER SERPL-CCNC: 69 U/L
ALT SERPL-CCNC: 31 U/L
ANION GAP SERPL CALC-SCNC: 6 MMOL/L (ref 0–18)
AST SERPL-CCNC: 24 U/L (ref 15–37)
BASOPHILS # BLD AUTO: 0.06 X10(3) UL (ref 0–0.2)
BASOPHILS NFR BLD AUTO: 1 %
BILIRUB SERPL-MCNC: 0.3 MG/DL (ref 0.1–2)
BUN BLD-MCNC: 20 MG/DL (ref 7–18)
BUN/CREAT SERPL: 24.1 (ref 10–20)
CALCIUM BLD-MCNC: 10 MG/DL (ref 8.5–10.1)
CHLORIDE SERPL-SCNC: 101 MMOL/L (ref 98–112)
CO2 SERPL-SCNC: 28 MMOL/L (ref 21–32)
CREAT BLD-MCNC: 0.83 MG/DL
DEPRECATED RDW RBC AUTO: 38.5 FL (ref 35.1–46.3)
EOSINOPHIL # BLD AUTO: 0.09 X10(3) UL (ref 0–0.7)
EOSINOPHIL NFR BLD AUTO: 1.5 %
ERYTHROCYTE [DISTWIDTH] IN BLOOD BY AUTOMATED COUNT: 11.9 % (ref 11–15)
GLOBULIN PLAS-MCNC: 4.2 G/DL (ref 2.8–4.4)
GLUCOSE BLD-MCNC: 95 MG/DL (ref 70–99)
HCT VFR BLD AUTO: 38.8 %
HGB BLD-MCNC: 12.8 G/DL
IMM GRANULOCYTES # BLD AUTO: 0.01 X10(3) UL (ref 0–1)
IMM GRANULOCYTES NFR BLD: 0.2 %
LYMPHOCYTES # BLD AUTO: 1.68 X10(3) UL (ref 1–4)
LYMPHOCYTES NFR BLD AUTO: 28.5 %
M PROTEIN MFR SERPL ELPH: 8.5 G/DL (ref 6.4–8.2)
MCH RBC QN AUTO: 29.4 PG (ref 26–34)
MCHC RBC AUTO-ENTMCNC: 33 G/DL (ref 31–37)
MCV RBC AUTO: 89.2 FL
MONOCYTES # BLD AUTO: 0.38 X10(3) UL (ref 0.1–1)
MONOCYTES NFR BLD AUTO: 6.5 %
NEUTROPHILS # BLD AUTO: 3.67 X10 (3) UL (ref 1.5–7.7)
NEUTROPHILS # BLD AUTO: 3.67 X10(3) UL (ref 1.5–7.7)
NEUTROPHILS NFR BLD AUTO: 62.3 %
OSMOLALITY SERPL CALC.SUM OF ELEC: 282 MOSM/KG (ref 275–295)
PATIENT FASTING Y/N/NP: NO
PLATELET # BLD AUTO: 281 10(3)UL (ref 150–450)
POTASSIUM SERPL-SCNC: 3.9 MMOL/L (ref 3.5–5.1)
RBC # BLD AUTO: 4.35 X10(6)UL
SODIUM SERPL-SCNC: 135 MMOL/L (ref 136–145)
TSI SER-ACNC: 1.72 MIU/ML (ref 0.36–3.74)
WBC # BLD AUTO: 5.9 X10(3) UL (ref 4–11)

## 2021-01-22 PROCEDURE — 99214 OFFICE O/P EST MOD 30 MIN: CPT | Performed by: FAMILY MEDICINE

## 2021-01-22 PROCEDURE — 80050 GENERAL HEALTH PANEL: CPT | Performed by: FAMILY MEDICINE

## 2021-01-22 PROCEDURE — 83013 H PYLORI (C-13) BREATH: CPT | Performed by: FAMILY MEDICINE

## 2021-01-22 PROCEDURE — 36415 COLL VENOUS BLD VENIPUNCTURE: CPT | Performed by: FAMILY MEDICINE

## 2021-01-22 NOTE — PROGRESS NOTES
Claudia Chen is a 44year old female who presents for stomachache. HPI    The patient complaints of abdominal pain. Pain is located at epigastrium. Pain is described as sharp, some radiation to the back.  Mild to moderate, worse with mels and better • Unspecified essential hypertension       Past Surgical History:   Procedure Laterality Date   •      • TUBAL LIGATION        Family History   Problem Relation Age of Onset   • Other (Other) Father    • Other (lung cancer) Father    • Hypertension M

## 2021-01-24 LAB — H. PYLORI BREATH TEST: NEGATIVE

## 2021-01-25 ENCOUNTER — TELEPHONE (OUTPATIENT)
Dept: FAMILY MEDICINE CLINIC | Facility: CLINIC | Age: 51
End: 2021-01-25

## 2021-01-25 RX ORDER — PANTOPRAZOLE SODIUM 40 MG/1
40 TABLET, DELAYED RELEASE ORAL
Qty: 90 TABLET | Refills: 0 | Status: SHIPPED | OUTPATIENT
Start: 2021-01-25

## 2021-01-25 NOTE — TELEPHONE ENCOUNTER
Please see pt e-mail with continued GI symptoms & advise on next step since H-Pylori & labs are normal. Thank you.

## 2021-01-25 NOTE — TELEPHONE ENCOUNTER
----- Message from Keshawn Hercules MD sent at 1/25/2021  9:52 AM CST -----  Normal, ok to send Pantoprazole 40mg po every day q am, low spicy diet, if not better in 30 days will do referral or imaging thanks.

## 2021-01-25 NOTE — TELEPHONE ENCOUNTER
From: Reymundo Govea  Sent: 1/24/2021 8:20 PM CST  To: [facility] Clinical Staff  Subject: RE: Visit Follow-up Question    Thank you  I see the results are negative   I did mention to dr that i took pepcid and tums  My symptoms are not better -today i took 2

## 2021-07-02 RX ORDER — HYDROCHLOROTHIAZIDE 12.5 MG/1
12.5 TABLET ORAL DAILY
Qty: 30 TABLET | Refills: 0 | Status: SHIPPED | OUTPATIENT
Start: 2021-07-02 | End: 2021-08-02

## 2021-07-02 RX ORDER — LOSARTAN POTASSIUM 50 MG/1
50 TABLET ORAL DAILY
Qty: 30 TABLET | Refills: 0 | Status: SHIPPED | OUTPATIENT
Start: 2021-07-02 | End: 2021-08-02

## 2021-07-09 ENCOUNTER — OFFICE VISIT (OUTPATIENT)
Dept: FAMILY MEDICINE CLINIC | Facility: CLINIC | Age: 51
End: 2021-07-09
Payer: COMMERCIAL

## 2021-07-09 VITALS
OXYGEN SATURATION: 99 % | SYSTOLIC BLOOD PRESSURE: 126 MMHG | TEMPERATURE: 98 F | WEIGHT: 164 LBS | DIASTOLIC BLOOD PRESSURE: 76 MMHG | RESPIRATION RATE: 16 BRPM | BODY MASS INDEX: 28 KG/M2 | HEIGHT: 64 IN | HEART RATE: 77 BPM

## 2021-07-09 DIAGNOSIS — I10 ESSENTIAL HYPERTENSION, BENIGN: ICD-10-CM

## 2021-07-09 DIAGNOSIS — Z12.31 ENCOUNTER FOR SCREENING MAMMOGRAM FOR BREAST CANCER: ICD-10-CM

## 2021-07-09 DIAGNOSIS — Z00.00 LABORATORY EXAMINATION ORDERED AS PART OF A ROUTINE GENERAL MEDICAL EXAMINATION: ICD-10-CM

## 2021-07-09 DIAGNOSIS — Z23 NEED FOR TDAP VACCINATION: ICD-10-CM

## 2021-07-09 DIAGNOSIS — Z00.00 WELLNESS EXAMINATION: Primary | ICD-10-CM

## 2021-07-09 PROCEDURE — 90471 IMMUNIZATION ADMIN: CPT | Performed by: FAMILY MEDICINE

## 2021-07-09 PROCEDURE — 3074F SYST BP LT 130 MM HG: CPT | Performed by: FAMILY MEDICINE

## 2021-07-09 PROCEDURE — 99396 PREV VISIT EST AGE 40-64: CPT | Performed by: FAMILY MEDICINE

## 2021-07-09 PROCEDURE — 3008F BODY MASS INDEX DOCD: CPT | Performed by: FAMILY MEDICINE

## 2021-07-09 PROCEDURE — 90715 TDAP VACCINE 7 YRS/> IM: CPT | Performed by: FAMILY MEDICINE

## 2021-07-09 PROCEDURE — 3078F DIAST BP <80 MM HG: CPT | Performed by: FAMILY MEDICINE

## 2021-07-09 NOTE — PROGRESS NOTES
HPI:     Orest Habermann is a 44year old female who presents for an Annual Health Visit. HTN - stable on meds. No acute concerns or complaints.      Allergies:   No Known Allergies    CURRENT MEDICATIONS:   Current Outpatient Medications   Medicat (1.626 m)   Wt 164 lb (74.4 kg)   SpO2 99%   BMI 28.15 kg/m²    Wt Readings from Last 6 Encounters:  07/09/21 : 164 lb (74.4 kg)  11/08/19 : 153 lb (69.4 kg)  09/03/19 : 160 lb (72.6 kg)  08/22/19 : 157 lb (71.2 kg)  02/07/19 : 144 lb (65.3 kg)  12/14/18 : routine general medical examination  -     CBC WITH DIFFERENTIAL WITH PLATELET; Future  -     COMP METABOLIC PANEL (14); Future  -     LIPID PANEL;  Future  -     TSH W REFLEX TO FREE T4; Future    Encounter for screening mammogram for breast cancer  - age group  Yearly checkup if your blood pressure is normal   Normal blood pressure is less than 120/80 mm Hg   If your blood pressure reading is higher than normal, follow the advice of your healthcare provider    Breast cancer All women at average risk in triglycerides  All women in this age group who are at risk for coronary artery disease  At least every 5 years; talk with your healthcare provider about your risk    HIV All women At least once during your lifetime; yearly if at high risk    Lung cancer Wo a year   Measles, mumps, rubella (MMR)  Women in this age group born in [birth year] or later who have no record of these infections or vaccines  1 or 2doses   Meningococcal Women at increased risk for infection  1 or more doses; talk with your healthcare provider reviewed this educational content on 6/1/2020  © 9628-2888 [employer]. All rights reserved. This information is not intended as a substitute for professional medical care. Always follow your healthcare professional's instructions.

## 2021-07-09 NOTE — PATIENT INSTRUCTIONS
Prevention Guidelines, Women Ages 48 to 59  Screening tests and vaccines are an important part of managing your health. A screening test is done to find possible disorders or diseases in people who don't have any symptoms.  The goal is to find a disease e have had a complete hysterectomy  Pap test every 3 years or Pap test with human papillomavirus (HPV) test every 5 years    Chlamydia Women who are sexually active and at increased risk for infection  At yearly routine exams    Colorectal cancer All women a with your healthcare provider for more information.     Obesity All women in this age group  At yearly routine exams    Osteoporosis Women who are postmenopausal  Talk with your healthcare provider    Syphilis Women at increased risk for infection  At [facility] Tetanus/diphtheria/pertussis (Td/Tdap) booster All women in this age group  Td every 10 years, or a 1-time dose of Tdap instead of a Td booster after age 25, then Td every 10 years    Recombinant zoster vaccine ([facility])  All women ages 48 and older  If 2 do

## 2021-07-10 ENCOUNTER — LAB ENCOUNTER (OUTPATIENT)
Dept: LAB | Age: 51
End: 2021-07-10
Attending: FAMILY MEDICINE
Payer: COMMERCIAL

## 2021-07-10 DIAGNOSIS — Z00.00 LABORATORY EXAMINATION ORDERED AS PART OF A ROUTINE GENERAL MEDICAL EXAMINATION: ICD-10-CM

## 2021-07-10 LAB
ALBUMIN SERPL-MCNC: 4.3 G/DL (ref 3.4–5)
ALBUMIN/GLOB SERPL: 1.1 {RATIO} (ref 1–2)
ALP LIVER SERPL-CCNC: 63 U/L
ALT SERPL-CCNC: 39 U/L
ANION GAP SERPL CALC-SCNC: 4 MMOL/L (ref 0–18)
AST SERPL-CCNC: 20 U/L (ref 15–37)
BASOPHILS # BLD AUTO: 0.04 X10(3) UL (ref 0–0.2)
BASOPHILS NFR BLD AUTO: 0.8 %
BILIRUB SERPL-MCNC: 0.3 MG/DL (ref 0.1–2)
BUN BLD-MCNC: 13 MG/DL (ref 7–18)
BUN/CREAT SERPL: 17.6 (ref 10–20)
CALCIUM BLD-MCNC: 9.5 MG/DL (ref 8.5–10.1)
CHLORIDE SERPL-SCNC: 106 MMOL/L (ref 98–112)
CHOLEST SMN-MCNC: 224 MG/DL (ref ?–200)
CO2 SERPL-SCNC: 28 MMOL/L (ref 21–32)
CREAT BLD-MCNC: 0.74 MG/DL
DEPRECATED RDW RBC AUTO: 42.1 FL (ref 35.1–46.3)
EOSINOPHIL # BLD AUTO: 0.1 X10(3) UL (ref 0–0.7)
EOSINOPHIL NFR BLD AUTO: 2 %
ERYTHROCYTE [DISTWIDTH] IN BLOOD BY AUTOMATED COUNT: 12.9 % (ref 11–15)
GLOBULIN PLAS-MCNC: 3.8 G/DL (ref 2.8–4.4)
GLUCOSE BLD-MCNC: 101 MG/DL (ref 70–99)
HCT VFR BLD AUTO: 38.4 %
HDLC SERPL-MCNC: 48 MG/DL (ref 40–59)
HGB BLD-MCNC: 12 G/DL
IMM GRANULOCYTES # BLD AUTO: 0.01 X10(3) UL (ref 0–1)
IMM GRANULOCYTES NFR BLD: 0.2 %
LDLC SERPL CALC-MCNC: 146 MG/DL (ref ?–100)
LYMPHOCYTES # BLD AUTO: 1.31 X10(3) UL (ref 1–4)
LYMPHOCYTES NFR BLD AUTO: 25.7 %
M PROTEIN MFR SERPL ELPH: 8.1 G/DL (ref 6.4–8.2)
MCH RBC QN AUTO: 28.3 PG (ref 26–34)
MCHC RBC AUTO-ENTMCNC: 31.3 G/DL (ref 31–37)
MCV RBC AUTO: 90.6 FL
MONOCYTES # BLD AUTO: 0.37 X10(3) UL (ref 0.1–1)
MONOCYTES NFR BLD AUTO: 7.3 %
NEUTROPHILS # BLD AUTO: 3.26 X10 (3) UL (ref 1.5–7.7)
NEUTROPHILS # BLD AUTO: 3.26 X10(3) UL (ref 1.5–7.7)
NEUTROPHILS NFR BLD AUTO: 64 %
NONHDLC SERPL-MCNC: 176 MG/DL (ref ?–130)
OSMOLALITY SERPL CALC.SUM OF ELEC: 286 MOSM/KG (ref 275–295)
PATIENT FASTING Y/N/NP: YES
PATIENT FASTING Y/N/NP: YES
PLATELET # BLD AUTO: 256 10(3)UL (ref 150–450)
POTASSIUM SERPL-SCNC: 4.2 MMOL/L (ref 3.5–5.1)
RBC # BLD AUTO: 4.24 X10(6)UL
SODIUM SERPL-SCNC: 138 MMOL/L (ref 136–145)
TRIGL SERPL-MCNC: 165 MG/DL (ref 30–149)
TSI SER-ACNC: 1.93 MIU/ML (ref 0.36–3.74)
VLDLC SERPL CALC-MCNC: 31 MG/DL (ref 0–30)
WBC # BLD AUTO: 5.1 X10(3) UL (ref 4–11)

## 2021-07-10 PROCEDURE — 80050 GENERAL HEALTH PANEL: CPT | Performed by: FAMILY MEDICINE

## 2021-07-10 PROCEDURE — 80061 LIPID PANEL: CPT | Performed by: FAMILY MEDICINE

## 2021-07-13 ENCOUNTER — TELEPHONE (OUTPATIENT)
Dept: FAMILY MEDICINE CLINIC | Facility: CLINIC | Age: 51
End: 2021-07-13

## 2021-07-13 NOTE — TELEPHONE ENCOUNTER
----- Message from jJ Baugh MD sent at 7/12/2021  4:11 PM CDT -----  Annual labs reviewed. 1. Fasting blood sugar mildly elevated -f ollow low carb diet, regular exercise.    2. Hyperlipidemia - 10yr ASCVD 2.6% - no need for statin at this ti

## 2021-08-02 RX ORDER — HYDROCHLOROTHIAZIDE 12.5 MG/1
TABLET ORAL
Qty: 90 TABLET | Refills: 0 | Status: SHIPPED | OUTPATIENT
Start: 2021-08-02 | End: 2021-10-22

## 2021-08-02 RX ORDER — LOSARTAN POTASSIUM 50 MG/1
50 TABLET ORAL DAILY
Qty: 90 TABLET | Refills: 0 | Status: SHIPPED | OUTPATIENT
Start: 2021-08-02 | End: 2021-10-22

## 2021-10-22 RX ORDER — LOSARTAN POTASSIUM 50 MG/1
TABLET ORAL
Qty: 90 TABLET | Refills: 0 | Status: SHIPPED | OUTPATIENT
Start: 2021-10-22 | End: 2022-01-28

## 2021-10-22 RX ORDER — HYDROCHLOROTHIAZIDE 12.5 MG/1
TABLET ORAL
Qty: 90 TABLET | Refills: 0 | Status: SHIPPED | OUTPATIENT
Start: 2021-10-22 | End: 2022-01-28

## 2021-12-15 ENCOUNTER — OFFICE VISIT (OUTPATIENT)
Dept: FAMILY MEDICINE CLINIC | Facility: CLINIC | Age: 51
End: 2021-12-15
Payer: COMMERCIAL

## 2021-12-15 VITALS
TEMPERATURE: 98 F | HEART RATE: 75 BPM | HEIGHT: 64 IN | SYSTOLIC BLOOD PRESSURE: 120 MMHG | DIASTOLIC BLOOD PRESSURE: 86 MMHG | OXYGEN SATURATION: 98 % | BODY MASS INDEX: 28.17 KG/M2 | RESPIRATION RATE: 18 BRPM | WEIGHT: 165 LBS

## 2021-12-15 DIAGNOSIS — Z20.822 SUSPECTED COVID-19 VIRUS INFECTION: Primary | ICD-10-CM

## 2021-12-15 PROCEDURE — 3079F DIAST BP 80-89 MM HG: CPT | Performed by: NURSE PRACTITIONER

## 2021-12-15 PROCEDURE — 99213 OFFICE O/P EST LOW 20 MIN: CPT | Performed by: NURSE PRACTITIONER

## 2021-12-15 PROCEDURE — 3008F BODY MASS INDEX DOCD: CPT | Performed by: NURSE PRACTITIONER

## 2021-12-15 PROCEDURE — 3074F SYST BP LT 130 MM HG: CPT | Performed by: NURSE PRACTITIONER

## 2021-12-16 NOTE — PROGRESS NOTES
CHIEF COMPLAINT:   Patient presents with: Allergies: x 3 days, cough      HPI:   Cristina Santo is a 44year old female who presents for upper respiratory symptoms for  3 days. Patient reports congestion, dry cough.  Symptoms have been worsening since ons intact  EARS: TM's without erythema, no bulging, noretraction,no fluid, bony landmarks visible  NOSE: Nostrils patent, clear nasal discharge, nasal mucosa pink and mildly inflamed   THROAT: Oral mucosa pink, moist. Posterior pharynx is not erythematous.  no

## 2022-01-03 ENCOUNTER — PATIENT MESSAGE (OUTPATIENT)
Dept: FAMILY MEDICINE CLINIC | Facility: CLINIC | Age: 52
End: 2022-01-03

## 2022-01-03 NOTE — TELEPHONE ENCOUNTER
From: Sarah Keene  To: Harley Kamara MD  Sent: 1/3/2022 10:56 AM CST  Subject: Symptoms    Hi  I am completely covid and flu vaccinated  And covid boosted  Covid Booster and Flu shots were given day apart-10/31 and 11/1.   After 2-3 weeks started hav

## 2022-01-03 NOTE — TELEPHONE ENCOUNTER
PSR could someone please help this pt schedule an appointment with an available provider. In office preferable for this. Thanks.

## 2022-01-28 RX ORDER — HYDROCHLOROTHIAZIDE 12.5 MG/1
TABLET ORAL
Qty: 90 TABLET | Refills: 0 | Status: SHIPPED | OUTPATIENT
Start: 2022-01-28

## 2022-01-28 RX ORDER — LOSARTAN POTASSIUM 50 MG/1
TABLET ORAL
Qty: 90 TABLET | Refills: 0 | Status: SHIPPED | OUTPATIENT
Start: 2022-01-28

## 2022-02-16 ENCOUNTER — OFFICE VISIT (OUTPATIENT)
Dept: FAMILY MEDICINE CLINIC | Facility: CLINIC | Age: 52
End: 2022-02-16
Payer: COMMERCIAL

## 2022-02-16 ENCOUNTER — LAB ENCOUNTER (OUTPATIENT)
Dept: LAB | Age: 52
End: 2022-02-16
Attending: FAMILY MEDICINE
Payer: COMMERCIAL

## 2022-02-16 VITALS
HEIGHT: 64 IN | OXYGEN SATURATION: 97 % | WEIGHT: 165 LBS | HEART RATE: 75 BPM | DIASTOLIC BLOOD PRESSURE: 84 MMHG | RESPIRATION RATE: 16 BRPM | SYSTOLIC BLOOD PRESSURE: 126 MMHG | BODY MASS INDEX: 28.17 KG/M2

## 2022-02-16 DIAGNOSIS — I10 PRIMARY HYPERTENSION: ICD-10-CM

## 2022-02-16 DIAGNOSIS — Z13.228 SCREENING FOR ENDOCRINE, NUTRITIONAL, METABOLIC AND IMMUNITY DISORDER: ICD-10-CM

## 2022-02-16 DIAGNOSIS — Z13.0 SCREENING FOR ENDOCRINE, NUTRITIONAL, METABOLIC AND IMMUNITY DISORDER: ICD-10-CM

## 2022-02-16 DIAGNOSIS — Z13.21 SCREENING FOR ENDOCRINE, NUTRITIONAL, METABOLIC AND IMMUNITY DISORDER: ICD-10-CM

## 2022-02-16 DIAGNOSIS — R07.2 PRECORDIAL PAIN: ICD-10-CM

## 2022-02-16 DIAGNOSIS — Z13.29 SCREENING FOR ENDOCRINE, NUTRITIONAL, METABOLIC AND IMMUNITY DISORDER: ICD-10-CM

## 2022-02-16 DIAGNOSIS — R06.02 SOB (SHORTNESS OF BREATH): ICD-10-CM

## 2022-02-16 DIAGNOSIS — R06.02 SOB (SHORTNESS OF BREATH): Primary | ICD-10-CM

## 2022-02-16 LAB
ALBUMIN SERPL-MCNC: 4.3 G/DL (ref 3.4–5)
ALBUMIN/GLOB SERPL: 1 {RATIO} (ref 1–2)
ALP LIVER SERPL-CCNC: 65 U/L
ALT SERPL-CCNC: 46 U/L
ANION GAP SERPL CALC-SCNC: 9 MMOL/L (ref 0–18)
AST SERPL-CCNC: 31 U/L (ref 15–37)
BASOPHILS # BLD AUTO: 0.07 X10(3) UL (ref 0–0.2)
BASOPHILS NFR BLD AUTO: 1.4 %
BILIRUB SERPL-MCNC: 0.3 MG/DL (ref 0.1–2)
BUN BLD-MCNC: 16 MG/DL (ref 7–18)
CALCIUM BLD-MCNC: 9.5 MG/DL (ref 8.5–10.1)
CHLORIDE SERPL-SCNC: 101 MMOL/L (ref 98–112)
CHOLEST SERPL-MCNC: 239 MG/DL (ref ?–200)
CO2 SERPL-SCNC: 25 MMOL/L (ref 21–32)
CREAT BLD-MCNC: 0.84 MG/DL
D DIMER PPP FEU-MCNC: <0.27 UG/ML FEU (ref ?–0.53)
EOSINOPHIL # BLD AUTO: 0.1 X10(3) UL (ref 0–0.7)
EOSINOPHIL NFR BLD AUTO: 2 %
ERYTHROCYTE [DISTWIDTH] IN BLOOD BY AUTOMATED COUNT: 12.4 %
FASTING PATIENT LIPID ANSWER: NO
GLOBULIN PLAS-MCNC: 4.3 G/DL (ref 2.8–4.4)
GLUCOSE BLD-MCNC: 98 MG/DL (ref 70–99)
HCT VFR BLD AUTO: 38.4 %
HDLC SERPL-MCNC: 47 MG/DL (ref 40–59)
HGB BLD-MCNC: 12.8 G/DL
IMM GRANULOCYTES # BLD AUTO: 0.01 X10(3) UL (ref 0–1)
IMM GRANULOCYTES NFR BLD: 0.2 %
LDLC SERPL CALC-MCNC: 137 MG/DL (ref ?–100)
LYMPHOCYTES NFR BLD AUTO: 27.2 %
MCH RBC QN AUTO: 30.2 PG (ref 26–34)
MCHC RBC AUTO-ENTMCNC: 33.3 G/DL (ref 31–37)
MCV RBC AUTO: 90.6 FL
MONOCYTES # BLD AUTO: 0.4 X10(3) UL (ref 0.1–1)
MONOCYTES NFR BLD AUTO: 7.8 %
NEUTROPHILS # BLD AUTO: 3.14 X10 (3) UL (ref 1.5–7.7)
NEUTROPHILS # BLD AUTO: 3.14 X10(3) UL (ref 1.5–7.7)
NEUTROPHILS NFR BLD AUTO: 61.4 %
NONHDLC SERPL-MCNC: 192 MG/DL (ref ?–130)
OSMOLALITY SERPL CALC.SUM OF ELEC: 281 MOSM/KG (ref 275–295)
PLATELET # BLD AUTO: 264 10(3)UL (ref 150–450)
POTASSIUM SERPL-SCNC: 3.9 MMOL/L (ref 3.5–5.1)
PROT SERPL-MCNC: 8.6 G/DL (ref 6.4–8.2)
RBC # BLD AUTO: 4.24 X10(6)UL
SODIUM SERPL-SCNC: 135 MMOL/L (ref 136–145)
TRIGL SERPL-MCNC: 307 MG/DL (ref 30–149)
TROPONIN I HIGH SENSITIVITY: <3 NG/L
TSI SER-ACNC: 1.98 MIU/ML (ref 0.36–3.74)
VIT D+METAB SERPL-MCNC: 41.6 NG/ML (ref 30–100)
VLDLC SERPL CALC-MCNC: 58 MG/DL (ref 0–30)
WBC # BLD AUTO: 5.1 X10(3) UL (ref 4–11)

## 2022-02-16 PROCEDURE — 80061 LIPID PANEL: CPT | Performed by: FAMILY MEDICINE

## 2022-02-16 PROCEDURE — 80050 GENERAL HEALTH PANEL: CPT | Performed by: FAMILY MEDICINE

## 2022-02-16 PROCEDURE — 84484 ASSAY OF TROPONIN QUANT: CPT | Performed by: FAMILY MEDICINE

## 2022-02-16 PROCEDURE — 3074F SYST BP LT 130 MM HG: CPT | Performed by: FAMILY MEDICINE

## 2022-02-16 PROCEDURE — 3079F DIAST BP 80-89 MM HG: CPT | Performed by: FAMILY MEDICINE

## 2022-02-16 PROCEDURE — 93000 ELECTROCARDIOGRAM COMPLETE: CPT | Performed by: FAMILY MEDICINE

## 2022-02-16 PROCEDURE — 99214 OFFICE O/P EST MOD 30 MIN: CPT | Performed by: FAMILY MEDICINE

## 2022-02-16 PROCEDURE — 82306 VITAMIN D 25 HYDROXY: CPT | Performed by: FAMILY MEDICINE

## 2022-02-16 PROCEDURE — 85379 FIBRIN DEGRADATION QUANT: CPT | Performed by: FAMILY MEDICINE

## 2022-02-16 PROCEDURE — 3008F BODY MASS INDEX DOCD: CPT | Performed by: FAMILY MEDICINE

## 2022-02-16 RX ORDER — BETAMETHASONE DIPROPIONATE 0.5 MG/G
OINTMENT TOPICAL
Qty: 50 G | Refills: 1 | Status: SHIPPED | OUTPATIENT
Start: 2022-02-16

## 2022-02-16 RX ORDER — PANTOPRAZOLE SODIUM 40 MG/1
40 TABLET, DELAYED RELEASE ORAL
Qty: 90 TABLET | Refills: 3 | Status: SHIPPED | OUTPATIENT
Start: 2022-02-16

## 2022-02-16 NOTE — PATIENT INSTRUCTIONS
[facility]     [address] [place] [address].    Troy Sandra   Ph: [phone]   Fax: [phone]

## 2022-02-17 ENCOUNTER — PATIENT MESSAGE (OUTPATIENT)
Dept: FAMILY MEDICINE CLINIC | Facility: CLINIC | Age: 52
End: 2022-02-17

## 2022-02-17 ENCOUNTER — TELEPHONE (OUTPATIENT)
Dept: FAMILY MEDICINE CLINIC | Facility: CLINIC | Age: 52
End: 2022-02-17

## 2022-02-17 NOTE — TELEPHONE ENCOUNTER
----- Message from Delaney Blevins MD sent at 2/17/2022  3:36 PM CST -----  Looks good, ok to take OTC fish oil for elevated TGL.

## 2022-02-25 ENCOUNTER — TELEPHONE (OUTPATIENT)
Dept: FAMILY MEDICINE CLINIC | Facility: CLINIC | Age: 52
End: 2022-02-25

## 2022-02-25 ENCOUNTER — HOSPITAL ENCOUNTER (OUTPATIENT)
Dept: CV DIAGNOSTICS | Age: 52
Discharge: HOME | End: 2022-02-25
Attending: FAMILY MEDICINE
Payer: COMMERCIAL

## 2022-02-25 DIAGNOSIS — I10 PRIMARY HYPERTENSION: ICD-10-CM

## 2022-02-25 DIAGNOSIS — R07.2 PRECORDIAL PAIN: ICD-10-CM

## 2022-02-25 PROCEDURE — 93306 TTE W/DOPPLER COMPLETE: CPT | Performed by: FAMILY MEDICINE

## 2022-03-02 ENCOUNTER — TELEPHONE (OUTPATIENT)
Dept: FAMILY MEDICINE CLINIC | Facility: CLINIC | Age: 52
End: 2022-03-02

## 2022-03-02 ENCOUNTER — HOSPITAL ENCOUNTER (OUTPATIENT)
Dept: CT IMAGING | Age: 52
Discharge: HOME | End: 2022-03-02
Attending: FAMILY MEDICINE

## 2022-03-02 DIAGNOSIS — I10 PRIMARY HYPERTENSION: ICD-10-CM

## 2022-03-02 DIAGNOSIS — R07.2 PRECORDIAL PAIN: ICD-10-CM

## 2022-03-02 NOTE — TELEPHONE ENCOUNTER
----- Message from Timbo Bello MD sent at 3/2/2022  1:29 PM CST -----  Small lung nodule, 12 months CT f/u.

## 2022-03-03 ENCOUNTER — PATIENT MESSAGE (OUTPATIENT)
Dept: FAMILY MEDICINE CLINIC | Facility: CLINIC | Age: 52
End: 2022-03-03

## 2022-03-03 NOTE — TELEPHONE ENCOUNTER
From: Tam Godwin  To: Katja Villegas MD  Sent: 3/3/2022 3:43 PM CST  Subject: Question regarding CT Scan Chest    Hi Dr  Since this test was truly a heart scan  Is there anything definitive test   Regarding the 2 nodules seen? Just because of my sob and smoking history  Fmh of cancer dad passed away lung ca and was smoker   I just wanted to be sure that there is no malignancy?     Thank you  Very much

## 2022-03-03 NOTE — TELEPHONE ENCOUNTER
Please see pt e-mail. Pt concerned about nodules seen on CT scoring test & is asking if anything else should be done besides repeat CT in 1 yr.

## 2022-03-04 ENCOUNTER — PATIENT MESSAGE (OUTPATIENT)
Dept: FAMILY MEDICINE CLINIC | Facility: CLINIC | Age: 52
End: 2022-03-04

## 2022-03-04 ENCOUNTER — TELEPHONE (OUTPATIENT)
Dept: FAMILY MEDICINE CLINIC | Facility: CLINIC | Age: 52
End: 2022-03-04

## 2022-03-04 NOTE — TELEPHONE ENCOUNTER
Called pt and discussed that CT calcium scoring test has two parts of results. Pt advised to f/u with pulm for nodule and informed that a mychart message was just sent. Questions answered and pt verbalized understanding.

## 2022-03-04 NOTE — TELEPHONE ENCOUNTER
Message from Dr. Sree Ryan:   Jorge Wilcox to see Tristian Garcia for second opinion     Ciro Damon MD   Pulmonology   Office number: [phone]   [facility]

## 2022-03-04 NOTE — TELEPHONE ENCOUNTER
From: Emre CHU  To: Sapphirenavi Calebjamari  Sent: 3/4/2022 9:34 AM CST  Subject: CT calcium scoring result     Good Morning Dr. Zachery Williamson has reviewed your CT calcium scoring result and states you had normal results. If you have any questions please contact the office. Thank you and have a good day.

## 2022-03-16 ENCOUNTER — HOSPITAL ENCOUNTER (OUTPATIENT)
Dept: CT IMAGING | Age: 52
Discharge: HOME | End: 2022-03-16
Attending: INTERNAL MEDICINE
Payer: COMMERCIAL

## 2022-03-16 DIAGNOSIS — R91.8 PULMONARY NODULES: ICD-10-CM

## 2022-03-16 PROCEDURE — 71250 CT THORAX DX C-: CPT | Performed by: INTERNAL MEDICINE

## 2022-03-30 ENCOUNTER — HOSPITAL ENCOUNTER (OUTPATIENT)
Dept: MAMMOGRAPHY | Age: 52
Discharge: HOME | End: 2022-03-30
Attending: FAMILY MEDICINE
Payer: COMMERCIAL

## 2022-03-30 ENCOUNTER — TELEPHONE (OUTPATIENT)
Dept: FAMILY MEDICINE CLINIC | Facility: CLINIC | Age: 52
End: 2022-03-30

## 2022-03-30 DIAGNOSIS — Z12.31 ENCOUNTER FOR SCREENING MAMMOGRAM FOR BREAST CANCER: ICD-10-CM

## 2022-03-30 PROCEDURE — 77063 BREAST TOMOSYNTHESIS BI: CPT | Performed by: FAMILY MEDICINE

## 2022-03-30 PROCEDURE — 77067 SCR MAMMO BI INCL CAD: CPT | Performed by: FAMILY MEDICINE

## 2022-03-30 NOTE — TELEPHONE ENCOUNTER
----- Message from Claire Fox MD sent at 3/30/2022  9:09 AM CDT -----  Normal mammogram, repeat annually.

## 2022-04-25 RX ORDER — HYDROCHLOROTHIAZIDE 12.5 MG/1
TABLET ORAL
Qty: 90 TABLET | Refills: 0 | Status: SHIPPED | OUTPATIENT
Start: 2022-04-25

## 2022-04-25 RX ORDER — LOSARTAN POTASSIUM 50 MG/1
TABLET ORAL
Qty: 90 TABLET | Refills: 0 | Status: SHIPPED | OUTPATIENT
Start: 2022-04-25

## 2022-06-17 ENCOUNTER — HOSPITAL ENCOUNTER (OUTPATIENT)
Dept: CT IMAGING | Age: 52
Discharge: HOME | End: 2022-06-17
Attending: INTERNAL MEDICINE
Payer: COMMERCIAL

## 2022-06-17 DIAGNOSIS — R91.8 PULMONARY NODULES: ICD-10-CM

## 2022-06-17 PROCEDURE — 71250 CT THORAX DX C-: CPT | Performed by: INTERNAL MEDICINE

## 2022-07-01 RX ORDER — HYDROCHLOROTHIAZIDE 12.5 MG/1
TABLET ORAL
Qty: 90 TABLET | Refills: 0 | Status: SHIPPED | OUTPATIENT
Start: 2022-07-01

## 2022-07-01 RX ORDER — LOSARTAN POTASSIUM 50 MG/1
TABLET ORAL
Qty: 90 TABLET | Refills: 0 | Status: SHIPPED | OUTPATIENT
Start: 2022-07-01

## 2022-07-26 ENCOUNTER — PATIENT MESSAGE (OUTPATIENT)
Dept: FAMILY MEDICINE CLINIC | Facility: CLINIC | Age: 52
End: 2022-07-26

## 2022-07-26 DIAGNOSIS — Z13.21 SCREENING FOR ENDOCRINE, NUTRITIONAL, METABOLIC AND IMMUNITY DISORDER: ICD-10-CM

## 2022-07-26 DIAGNOSIS — Z13.29 SCREENING FOR ENDOCRINE, NUTRITIONAL, METABOLIC AND IMMUNITY DISORDER: ICD-10-CM

## 2022-07-26 DIAGNOSIS — Z00.00 ROUTINE GENERAL MEDICAL EXAMINATION AT A HEALTH CARE FACILITY: ICD-10-CM

## 2022-07-26 DIAGNOSIS — Z13.220 SCREENING FOR LIPOID DISORDERS: ICD-10-CM

## 2022-07-26 DIAGNOSIS — Z13.228 SCREENING FOR ENDOCRINE, NUTRITIONAL, METABOLIC AND IMMUNITY DISORDER: ICD-10-CM

## 2022-07-26 DIAGNOSIS — Z13.0 SCREENING FOR IRON DEFICIENCY ANEMIA: Primary | ICD-10-CM

## 2022-07-26 DIAGNOSIS — Z13.29 SCREENING FOR THYROID DISORDER: ICD-10-CM

## 2022-07-26 DIAGNOSIS — Z13.0 SCREENING FOR ENDOCRINE, NUTRITIONAL, METABOLIC AND IMMUNITY DISORDER: ICD-10-CM

## 2022-07-26 NOTE — TELEPHONE ENCOUNTER
From: Marcelo Daugherty  To: Jelena Wright MD  Sent: 7/26/2022 1:45 PM CDT  Subject: Fabian Roy  I will be seeing you on Friday for my annual visit  I was wondeting if i can get labs before i see you? Or should i wait? I am hoping my blood sugars are good but with my family history and just recently drinking and voiding a lot is a concern. .  Hate getting old.   Let me know what you think i was hoping to have numbers before i see you-if u want me to wait i will    Thank you and see you

## 2022-07-27 NOTE — TELEPHONE ENCOUNTER
Vianca Bonner MD  [facility] Clinical Staff 33 minutes ago (2:19 PM)     LP    Ok for all screening labs including vit.  D and HgbA1C thanks, she can do it tomorrow

## 2022-07-28 ENCOUNTER — LAB ENCOUNTER (OUTPATIENT)
Dept: LAB | Age: 52
End: 2022-07-28
Attending: FAMILY MEDICINE
Payer: COMMERCIAL

## 2022-07-28 DIAGNOSIS — Z13.0 SCREENING FOR IRON DEFICIENCY ANEMIA: ICD-10-CM

## 2022-07-28 DIAGNOSIS — Z13.228 SCREENING FOR ENDOCRINE, NUTRITIONAL, METABOLIC AND IMMUNITY DISORDER: ICD-10-CM

## 2022-07-28 DIAGNOSIS — Z13.21 SCREENING FOR ENDOCRINE, NUTRITIONAL, METABOLIC AND IMMUNITY DISORDER: ICD-10-CM

## 2022-07-28 DIAGNOSIS — Z13.29 SCREENING FOR ENDOCRINE, NUTRITIONAL, METABOLIC AND IMMUNITY DISORDER: ICD-10-CM

## 2022-07-28 DIAGNOSIS — Z13.29 SCREENING FOR THYROID DISORDER: ICD-10-CM

## 2022-07-28 DIAGNOSIS — Z13.0 SCREENING FOR ENDOCRINE, NUTRITIONAL, METABOLIC AND IMMUNITY DISORDER: ICD-10-CM

## 2022-07-28 DIAGNOSIS — Z13.220 SCREENING FOR LIPOID DISORDERS: ICD-10-CM

## 2022-07-28 LAB
ALBUMIN SERPL-MCNC: 4.6 G/DL (ref 3.4–5)
ALBUMIN/GLOB SERPL: 1.1 {RATIO} (ref 1–2)
ALP LIVER SERPL-CCNC: 77 U/L
ALT SERPL-CCNC: 44 U/L
ANION GAP SERPL CALC-SCNC: 7 MMOL/L (ref 0–18)
AST SERPL-CCNC: 27 U/L (ref 15–37)
BASOPHILS # BLD AUTO: 0.05 X10(3) UL (ref 0–0.2)
BASOPHILS NFR BLD AUTO: 0.9 %
BILIRUB SERPL-MCNC: 0.4 MG/DL (ref 0.1–2)
BUN BLD-MCNC: 15 MG/DL (ref 7–18)
CALCIUM BLD-MCNC: 9.8 MG/DL (ref 8.5–10.1)
CHLORIDE SERPL-SCNC: 99 MMOL/L (ref 98–112)
CHOLEST SERPL-MCNC: 230 MG/DL (ref ?–200)
CO2 SERPL-SCNC: 26 MMOL/L (ref 21–32)
CREAT BLD-MCNC: 0.74 MG/DL
EOSINOPHIL # BLD AUTO: 0.03 X10(3) UL (ref 0–0.7)
EOSINOPHIL NFR BLD AUTO: 0.5 %
ERYTHROCYTE [DISTWIDTH] IN BLOOD BY AUTOMATED COUNT: 12.9 %
EST. AVERAGE GLUCOSE BLD GHB EST-MCNC: 126 MG/DL (ref 68–126)
FASTING PATIENT LIPID ANSWER: YES
FASTING STATUS PATIENT QL REPORTED: YES
GLOBULIN PLAS-MCNC: 4.1 G/DL (ref 2.8–4.4)
GLUCOSE BLD-MCNC: 104 MG/DL (ref 70–99)
HBA1C MFR BLD: 6 % (ref ?–5.7)
HCT VFR BLD AUTO: 39.5 %
HDLC SERPL-MCNC: 59 MG/DL (ref 40–59)
HGB BLD-MCNC: 13.2 G/DL
IMM GRANULOCYTES # BLD AUTO: 0.01 X10(3) UL (ref 0–1)
IMM GRANULOCYTES NFR BLD: 0.2 %
LDLC SERPL CALC-MCNC: 155 MG/DL (ref ?–100)
LYMPHOCYTES # BLD AUTO: 1.33 X10(3) UL (ref 1–4)
LYMPHOCYTES NFR BLD AUTO: 23.1 %
MCH RBC QN AUTO: 30.5 PG (ref 26–34)
MCHC RBC AUTO-ENTMCNC: 33.4 G/DL (ref 31–37)
MCV RBC AUTO: 91.2 FL
MONOCYTES # BLD AUTO: 0.38 X10(3) UL (ref 0.1–1)
MONOCYTES NFR BLD AUTO: 6.6 %
NEUTROPHILS # BLD AUTO: 3.95 X10 (3) UL (ref 1.5–7.7)
NEUTROPHILS # BLD AUTO: 3.95 X10(3) UL (ref 1.5–7.7)
NEUTROPHILS NFR BLD AUTO: 68.7 %
NONHDLC SERPL-MCNC: 171 MG/DL (ref ?–130)
OSMOLALITY SERPL CALC.SUM OF ELEC: 275 MOSM/KG (ref 275–295)
PLATELET # BLD AUTO: 257 10(3)UL (ref 150–450)
POTASSIUM SERPL-SCNC: 3.5 MMOL/L (ref 3.5–5.1)
PROT SERPL-MCNC: 8.7 G/DL (ref 6.4–8.2)
RBC # BLD AUTO: 4.33 X10(6)UL
SODIUM SERPL-SCNC: 132 MMOL/L (ref 136–145)
TRIGL SERPL-MCNC: 90 MG/DL (ref 30–149)
TSI SER-ACNC: 1.66 MIU/ML (ref 0.36–3.74)
VIT D+METAB SERPL-MCNC: 55.1 NG/ML (ref 30–100)
VLDLC SERPL CALC-MCNC: 17 MG/DL (ref 0–30)
WBC # BLD AUTO: 5.8 X10(3) UL (ref 4–11)

## 2022-07-28 PROCEDURE — 83036 HEMOGLOBIN GLYCOSYLATED A1C: CPT | Performed by: FAMILY MEDICINE

## 2022-07-28 PROCEDURE — 80050 GENERAL HEALTH PANEL: CPT | Performed by: FAMILY MEDICINE

## 2022-07-28 PROCEDURE — 80061 LIPID PANEL: CPT | Performed by: FAMILY MEDICINE

## 2022-07-28 PROCEDURE — 82306 VITAMIN D 25 HYDROXY: CPT | Performed by: FAMILY MEDICINE

## 2022-07-29 ENCOUNTER — OFFICE VISIT (OUTPATIENT)
Dept: FAMILY MEDICINE CLINIC | Facility: CLINIC | Age: 52
End: 2022-07-29
Payer: COMMERCIAL

## 2022-07-29 VITALS
BODY MASS INDEX: 27.31 KG/M2 | HEIGHT: 64 IN | WEIGHT: 160 LBS | RESPIRATION RATE: 16 BRPM | HEART RATE: 68 BPM | OXYGEN SATURATION: 99 % | DIASTOLIC BLOOD PRESSURE: 88 MMHG | SYSTOLIC BLOOD PRESSURE: 124 MMHG

## 2022-07-29 DIAGNOSIS — R05.3 CHRONIC COUGH: ICD-10-CM

## 2022-07-29 DIAGNOSIS — R73.03 PREDIABETES: ICD-10-CM

## 2022-07-29 DIAGNOSIS — Z12.4 ENCOUNTER FOR SCREENING FOR MALIGNANT NEOPLASM OF CERVIX: ICD-10-CM

## 2022-07-29 DIAGNOSIS — Z01.419 ENCOUNTER FOR WELL WOMAN EXAM WITH ROUTINE GYNECOLOGICAL EXAM: Primary | ICD-10-CM

## 2022-07-29 PROCEDURE — 3008F BODY MASS INDEX DOCD: CPT | Performed by: FAMILY MEDICINE

## 2022-07-29 PROCEDURE — 3079F DIAST BP 80-89 MM HG: CPT | Performed by: FAMILY MEDICINE

## 2022-07-29 PROCEDURE — 99213 OFFICE O/P EST LOW 20 MIN: CPT | Performed by: FAMILY MEDICINE

## 2022-07-29 PROCEDURE — 99396 PREV VISIT EST AGE 40-64: CPT | Performed by: FAMILY MEDICINE

## 2022-07-29 PROCEDURE — 3074F SYST BP LT 130 MM HG: CPT | Performed by: FAMILY MEDICINE

## 2022-07-29 PROCEDURE — 87624 HPV HI-RISK TYP POOLED RSLT: CPT | Performed by: FAMILY MEDICINE

## 2022-07-29 RX ORDER — DOXYCYCLINE HYCLATE 100 MG/1
CAPSULE ORAL
Qty: 8 CAPSULE | Refills: 0 | Status: SHIPPED | OUTPATIENT
Start: 2022-07-29

## 2022-07-29 RX ORDER — PREDNISONE 20 MG/1
TABLET ORAL
Qty: 10 TABLET | Refills: 0 | Status: SHIPPED | OUTPATIENT
Start: 2022-07-29

## 2022-07-29 NOTE — PATIENT INSTRUCTIONS
[facility]    Dr. Timur Paz  [address] [place] [address], [address]  [facility]    Phone:[phone]  Fax:[phone]          Restore moisturizing vaginal gel    [employer]        Dr. Gabrielle Nava        [address] [place] [address]           \"[place], hope and dont worry\" MICHAEL De [address]  Phone: [phone]  Fax: [phone]

## 2022-08-01 LAB — HPV I/H RISK 1 DNA SPEC QL NAA+PROBE: NEGATIVE

## 2022-08-17 ENCOUNTER — OFFICE VISIT (OUTPATIENT)
Facility: LOCATION | Age: 52
End: 2022-08-17
Payer: COMMERCIAL

## 2022-08-17 VITALS — HEART RATE: 62 BPM | TEMPERATURE: 97 F

## 2022-08-17 DIAGNOSIS — Z12.11 ENCOUNTER FOR SCREENING COLONOSCOPY: Primary | ICD-10-CM

## 2022-08-17 RX ORDER — POLYETHYLENE GLYCOL 3350, SODIUM CHLORIDE, SODIUM BICARBONATE, POTASSIUM CHLORIDE 420; 11.2; 5.72; 1.48 G/4L; G/4L; G/4L; G/4L
POWDER, FOR SOLUTION ORAL
Qty: 1 EACH | Refills: 0 | Status: SHIPPED | OUTPATIENT
Start: 2022-08-17

## 2022-09-15 ENCOUNTER — PATIENT MESSAGE (OUTPATIENT)
Dept: FAMILY MEDICINE CLINIC | Facility: CLINIC | Age: 52
End: 2022-09-15

## 2022-09-15 NOTE — TELEPHONE ENCOUNTER
Regarding: FW: Ct and pulmonary consult  Ok to [facility] CT lungs in [address], same what specialist want.  ----- Message -----  From: Junior Mia RN  Sent: 9/15/2022  10:57 AM CDT  To: Katja Villegas MD  Subject: Ct and pulmonary consult                         ----- Message from Elias Mills RN sent at 9/15/2022 10:57 AM CDT -----       ----- Message from Juliet Aguilera to Any Roberts MD sent at 9/15/2022 10:20 AM -----   Hi dr Cyndi Avilez  I have made a decision to not go to [facility] healthcare for my respiratory needs and would like to follow up with you. This [facility] healthcare would like me to go to their facility for ct and i stated [facility] in [place] is very close to me so as of today i still cant schedule as [facility] does not have the ct orders from dr Mamie No cx my appt with dr Kely Foster . 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 Im suppose to have ct this month as a follow up and curious to see what it shows   The cough that sounds like a bark has lessened since antibiotoc and prednisone but still there occassionally. i do take the inhaler she gave me she stated she wanted to wean me off but pharmacy still has same dose ,i refuse to talk to [facility] anymore not happy with them at all sorry so whatever u think is necessary please lmk . i get all my cts at [address] and would like to only follow up with u at this time  Thank u sorry so long have a great day

## 2022-09-15 NOTE — TELEPHONE ENCOUNTER
From: Rosanna Fajardo  To: Salome Randle MD  Sent: 9/15/2022 10:20 AM CDT  Subject: Ct and pulmonary consult    Hi dr Sonny Snider  I have made a decision to not go to [facility] healthcare for my respiratory needs and would like to follow up with you. This [facility] healthcare would like me to go to their facility for ct and i stated [facility] [place] [facility] very close to me so as of today i still cant schedule as [facility] does not have the ct orders from dr Yeni Gandara cx my appt with dr Kun Amor . 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 Im suppose to have ct this month as a follow up and curious to see what it shows   The cough that sounds like a bark has lessened since antibiotoc and prednisone but still there occassionally. i do take the inhaler she gave me she stated she wanted to wean me off but pharmacy still has same dose ,i refuse to talk to [facility] anymore not happy with them at all sorry so whatever u think is necessary please lmk . i get all my cts at [address] and would like to only follow up with u at this time  Thank u nikolasry so long have a great day

## 2022-09-16 ENCOUNTER — PATIENT MESSAGE (OUTPATIENT)
Dept: FAMILY MEDICINE CLINIC | Facility: CLINIC | Age: 52
End: 2022-09-16

## 2022-09-16 NOTE — TELEPHONE ENCOUNTER
From: Leatha Hankins  Sent: 9/16/2022 9:24 AM CDT  To: [facility] Clinical [facility]  Subject: CT scan    Thank you  Would it be ok to follow up with dr Rhonda Baker versus pulmonary dr baxter/calin?  Does it matter who ordered the CT?   Shani Gunderson will

## 2022-09-16 NOTE — TELEPHONE ENCOUNTER
Patient wondering if she would still need to follow up with pulmonologist regarding pulmonary nodules or if PCP will be handling.

## 2022-09-16 NOTE — TELEPHONE ENCOUNTER
Dr Rudy Whitney   Do you know of any pulm's affiliated with  or of that Dr Cleo Cotton that pt mentioned?   Pt does not want to go back to [facility]

## 2022-09-19 NOTE — TELEPHONE ENCOUNTER
Delmis Shen MD  [facility] 3 hours ago (12:17 PM)     LP    [id number] [address] for referrals    Message text

## 2022-09-21 ENCOUNTER — HOSPITAL ENCOUNTER (OUTPATIENT)
Dept: CT IMAGING | Age: 52
Discharge: HOME | End: 2022-09-21
Attending: INTERNAL MEDICINE

## 2022-09-21 DIAGNOSIS — R91.8 PULMONARY NODULES: ICD-10-CM

## 2022-09-21 PROCEDURE — 71250 CT THORAX DX C-: CPT | Performed by: INTERNAL MEDICINE

## 2022-10-04 ENCOUNTER — PATIENT MESSAGE (OUTPATIENT)
Dept: FAMILY MEDICINE CLINIC | Facility: CLINIC | Age: 52
End: 2022-10-04

## 2022-10-04 ENCOUNTER — OFFICE VISIT (OUTPATIENT)
Dept: FAMILY MEDICINE CLINIC | Facility: CLINIC | Age: 52
End: 2022-10-04
Payer: COMMERCIAL

## 2022-10-04 VITALS
RESPIRATION RATE: 16 BRPM | BODY MASS INDEX: 27.31 KG/M2 | WEIGHT: 160 LBS | HEART RATE: 64 BPM | OXYGEN SATURATION: 100 % | HEIGHT: 64 IN | DIASTOLIC BLOOD PRESSURE: 84 MMHG | SYSTOLIC BLOOD PRESSURE: 120 MMHG

## 2022-10-04 DIAGNOSIS — H66.90 ACUTE OTITIS MEDIA, UNSPECIFIED OTITIS MEDIA TYPE: ICD-10-CM

## 2022-10-04 DIAGNOSIS — H81.13 BENIGN POSITIONAL VERTIGO, BILATERAL: Primary | ICD-10-CM

## 2022-10-04 DIAGNOSIS — R42 DIZZINESS AND GIDDINESS: ICD-10-CM

## 2022-10-04 RX ORDER — METHYLPREDNISOLONE 4 MG/1
TABLET ORAL
Qty: 21 EACH | Refills: 0 | Status: SHIPPED | OUTPATIENT
Start: 2022-10-04

## 2022-10-04 RX ORDER — AMOXICILLIN AND CLAVULANATE POTASSIUM 875; 125 MG/1; MG/1
1 TABLET, FILM COATED ORAL 2 TIMES DAILY
Qty: 14 TABLET | Refills: 0 | Status: SHIPPED | OUTPATIENT
Start: 2022-10-04 | End: 2022-10-11

## 2022-10-04 RX ORDER — MONTELUKAST SODIUM 10 MG/1
10 TABLET ORAL NIGHTLY
Qty: 30 TABLET | Refills: 0 | Status: SHIPPED | OUTPATIENT
Start: 2022-10-04 | End: 2022-11-03

## 2022-10-04 RX ORDER — MECLIZINE HYDROCHLORIDE CHEWABLE TABLETS 25 MG/1
1 TABLET, CHEWABLE ORAL 3 TIMES DAILY PRN
Qty: 90 TABLET | Refills: 0 | Status: SHIPPED | OUTPATIENT
Start: 2022-10-04 | End: 2022-11-03

## 2022-10-04 NOTE — TELEPHONE ENCOUNTER
From: Karina Anglin  Sent: 10/4/2022 9:17 AM CDT  To: [facility] Clinical Staff  Subject: Vertigo    Thank you Syeda  I will call in a bit to get set up Omaira Israel

## 2022-10-04 NOTE — TELEPHONE ENCOUNTER
From: Ebony Bhatia  To:  Live Hernandez MD  Sent: 10/4/2022 7:48 AM CDT  Subject: Vertigo    Hi dr Spears Delay  Last thursday i was in bed pretty much whole day  Took meclizine you prescribed a year ago lol as this is the only med i had available  Prior to that my left ear was plugged on wednesday   Im still having this problem on and off and had been taking meclizine 12.5mg 3x day  Im able to walk now and [facility] but it comes and goes that i have not driven since and have not done housework like i normally do  wondring if i can come in to see you  I have 11am tomorrow at [facility] pulmonologist i can come tomorrow after if you are working /id rather see you but if i cant then i can see who is available  Thank you

## 2022-10-07 ENCOUNTER — HOSPITAL ENCOUNTER (OUTPATIENT)
Dept: ULTRASOUND IMAGING | Age: 52
Discharge: HOME | End: 2022-10-07
Attending: NURSE PRACTITIONER
Payer: COMMERCIAL

## 2022-10-07 DIAGNOSIS — R42 DIZZINESS AND GIDDINESS: ICD-10-CM

## 2022-10-07 PROCEDURE — 93880 EXTRACRANIAL BILAT STUDY: CPT | Performed by: NURSE PRACTITIONER

## 2022-10-08 ENCOUNTER — LAB ENCOUNTER (OUTPATIENT)
Dept: LAB | Age: 52
End: 2022-10-08
Attending: NURSE PRACTITIONER
Payer: COMMERCIAL

## 2022-10-08 DIAGNOSIS — R42 DIZZINESS AND GIDDINESS: ICD-10-CM

## 2022-10-08 LAB
ALBUMIN SERPL-MCNC: 4.3 G/DL (ref 3.4–5)
ALBUMIN/GLOB SERPL: 1 {RATIO} (ref 1–2)
ALP LIVER SERPL-CCNC: 68 U/L
ALT SERPL-CCNC: 53 U/L
ANION GAP SERPL CALC-SCNC: 6 MMOL/L (ref 0–18)
AST SERPL-CCNC: 29 U/L (ref 15–37)
BASOPHILS # BLD AUTO: 0.03 X10(3) UL (ref 0–0.2)
BASOPHILS NFR BLD AUTO: 0.3 %
BILIRUB SERPL-MCNC: 0.4 MG/DL (ref 0.1–2)
BUN BLD-MCNC: 18 MG/DL (ref 7–18)
CALCIUM BLD-MCNC: 9.6 MG/DL (ref 8.5–10.1)
CHLORIDE SERPL-SCNC: 99 MMOL/L (ref 98–112)
CO2 SERPL-SCNC: 25 MMOL/L (ref 21–32)
CREAT BLD-MCNC: 0.78 MG/DL
DEPRECATED HBV CORE AB SER IA-ACNC: 100.2 NG/ML
EOSINOPHIL # BLD AUTO: 0.01 X10(3) UL (ref 0–0.7)
EOSINOPHIL NFR BLD AUTO: 0.1 %
ERYTHROCYTE [DISTWIDTH] IN BLOOD BY AUTOMATED COUNT: 12.2 %
FASTING STATUS PATIENT QL REPORTED: NO
GFR SERPLBLD BASED ON 1.73 SQ M-ARVRAT: 91 ML/MIN/1.73M2 (ref 60–?)
GLOBULIN PLAS-MCNC: 4.2 G/DL (ref 2.8–4.4)
GLUCOSE BLD-MCNC: 93 MG/DL (ref 70–99)
HCT VFR BLD AUTO: 39 %
HGB BLD-MCNC: 13.4 G/DL
IMM GRANULOCYTES # BLD AUTO: 0.03 X10(3) UL (ref 0–1)
IMM GRANULOCYTES NFR BLD: 0.3 %
IRON SATN MFR SERPL: 32 %
IRON SERPL-MCNC: 133 UG/DL
LYMPHOCYTES # BLD AUTO: 1.27 X10(3) UL (ref 1–4)
LYMPHOCYTES NFR BLD AUTO: 13.7 %
MCH RBC QN AUTO: 31.5 PG (ref 26–34)
MCHC RBC AUTO-ENTMCNC: 34.4 G/DL (ref 31–37)
MCV RBC AUTO: 91.5 FL
MONOCYTES # BLD AUTO: 0.8 X10(3) UL (ref 0.1–1)
MONOCYTES NFR BLD AUTO: 8.7 %
NEUTROPHILS # BLD AUTO: 7.1 X10 (3) UL (ref 1.5–7.7)
NEUTROPHILS # BLD AUTO: 7.1 X10(3) UL (ref 1.5–7.7)
NEUTROPHILS NFR BLD AUTO: 76.9 %
OSMOLALITY SERPL CALC.SUM OF ELEC: 272 MOSM/KG (ref 275–295)
PLATELET # BLD AUTO: 282 10(3)UL (ref 150–450)
POTASSIUM SERPL-SCNC: 4.3 MMOL/L (ref 3.5–5.1)
PROT SERPL-MCNC: 8.5 G/DL (ref 6.4–8.2)
RBC # BLD AUTO: 4.26 X10(6)UL
SODIUM SERPL-SCNC: 130 MMOL/L (ref 136–145)
TIBC SERPL-MCNC: 422 UG/DL (ref 240–450)
TRANSFERRIN SERPL-MCNC: 283 MG/DL (ref 200–360)
TSI SER-ACNC: 1.34 MIU/ML (ref 0.36–3.74)
WBC # BLD AUTO: 9.2 X10(3) UL (ref 4–11)

## 2022-10-08 PROCEDURE — 80050 GENERAL HEALTH PANEL: CPT | Performed by: NURSE PRACTITIONER

## 2022-10-08 PROCEDURE — 82728 ASSAY OF FERRITIN: CPT | Performed by: NURSE PRACTITIONER

## 2022-10-08 PROCEDURE — 83550 IRON BINDING TEST: CPT | Performed by: NURSE PRACTITIONER

## 2022-10-08 PROCEDURE — 83540 ASSAY OF IRON: CPT | Performed by: NURSE PRACTITIONER

## 2022-10-10 ENCOUNTER — TELEPHONE (OUTPATIENT)
Dept: FAMILY MEDICINE CLINIC | Facility: CLINIC | Age: 52
End: 2022-10-10

## 2022-10-10 DIAGNOSIS — E87.1 SERUM SODIUM DECREASED: Primary | ICD-10-CM

## 2022-10-10 NOTE — TELEPHONE ENCOUNTER
----- Message from MAYKEL Avitia sent at 10/10/2022  8:55 AM CDT -----  Labs are good , sodium slightly low -can take 0 sugar gatorade twice per week , will recheck sodium in 2 weeks

## 2022-10-10 NOTE — TELEPHONE ENCOUNTER
Pt notified of results & below orders. Lab placed in [facility]. All questions answered, pt expresses understanding.

## 2022-10-21 ENCOUNTER — LAB ENCOUNTER (OUTPATIENT)
Dept: LAB | Age: 52
End: 2022-10-21
Attending: NURSE PRACTITIONER
Payer: COMMERCIAL

## 2022-10-21 DIAGNOSIS — E87.1 SERUM SODIUM DECREASED: ICD-10-CM

## 2022-10-21 LAB — SODIUM SERPL-SCNC: 136 MMOL/L (ref 136–145)

## 2022-10-21 PROCEDURE — 84295 ASSAY OF SERUM SODIUM: CPT | Performed by: NURSE PRACTITIONER

## 2022-10-24 ENCOUNTER — PATIENT MESSAGE (OUTPATIENT)
Dept: FAMILY MEDICINE CLINIC | Facility: CLINIC | Age: 52
End: 2022-10-24

## 2022-10-24 DIAGNOSIS — H66.90 ACUTE OTITIS MEDIA, UNSPECIFIED OTITIS MEDIA TYPE: ICD-10-CM

## 2022-10-25 RX ORDER — MONTELUKAST SODIUM 10 MG/1
10 TABLET ORAL NIGHTLY
Qty: 90 TABLET | Refills: 0 | Status: SHIPPED | OUTPATIENT
Start: 2022-10-25 | End: 2023-01-23

## 2022-10-25 NOTE — TELEPHONE ENCOUNTER
From: Dorothy Fang  To: MAYKEL Pereira  Sent: 10/24/2022 12:57 PM CDT  Subject: Question regarding SODIUM, SERUM    Appointment  Am i suppose to see you?   Thought u said see u after 2 weeks  The singulair is helping me a lot  No dizziness/vertigo but i didnt get the holter done  I feel ok    Please lmk  I will call office  I dont think i have refils for singulair  Ty

## 2022-10-25 NOTE — TELEPHONE ENCOUNTER
Patient wondering if she should F/U. Symptoms improved with Singulair. Has not scheduled holter monitor. Please advise. Also requesting refill on Singulair.

## 2022-11-02 RX ORDER — LOSARTAN POTASSIUM 50 MG/1
TABLET ORAL
Qty: 90 TABLET | Refills: 0 | Status: SHIPPED | OUTPATIENT
Start: 2022-11-02

## 2023-01-16 ENCOUNTER — OFFICE VISIT (OUTPATIENT)
Dept: FAMILY MEDICINE CLINIC | Facility: CLINIC | Age: 53
End: 2023-01-16
Payer: COMMERCIAL

## 2023-01-16 VITALS
WEIGHT: 161 LBS | HEART RATE: 76 BPM | HEIGHT: 64 IN | BODY MASS INDEX: 27.49 KG/M2 | OXYGEN SATURATION: 97 % | RESPIRATION RATE: 16 BRPM | SYSTOLIC BLOOD PRESSURE: 134 MMHG | DIASTOLIC BLOOD PRESSURE: 84 MMHG

## 2023-01-16 DIAGNOSIS — H69.93 EUSTACHIAN TUBE DISORDER, BILATERAL: Primary | ICD-10-CM

## 2023-01-16 PROCEDURE — 3079F DIAST BP 80-89 MM HG: CPT | Performed by: NURSE PRACTITIONER

## 2023-01-16 PROCEDURE — 99214 OFFICE O/P EST MOD 30 MIN: CPT | Performed by: NURSE PRACTITIONER

## 2023-01-16 PROCEDURE — 3008F BODY MASS INDEX DOCD: CPT | Performed by: NURSE PRACTITIONER

## 2023-01-16 PROCEDURE — 3075F SYST BP GE 130 - 139MM HG: CPT | Performed by: NURSE PRACTITIONER

## 2023-01-16 RX ORDER — CIPROFLOXACIN AND DEXAMETHASONE 3; 1 MG/ML; MG/ML
SUSPENSION/ DROPS AURICULAR (OTIC)
Qty: 7.5 ML | Refills: 0 | Status: SHIPPED | OUTPATIENT
Start: 2023-01-16

## 2023-02-05 DIAGNOSIS — H66.90 ACUTE OTITIS MEDIA, UNSPECIFIED OTITIS MEDIA TYPE: ICD-10-CM

## 2023-02-06 RX ORDER — LOSARTAN POTASSIUM 50 MG/1
TABLET ORAL
Qty: 90 TABLET | Refills: 0 | Status: SHIPPED | OUTPATIENT
Start: 2023-02-06

## 2023-02-06 RX ORDER — MONTELUKAST SODIUM 10 MG/1
10 TABLET ORAL NIGHTLY
Qty: 90 TABLET | Refills: 3 | Status: SHIPPED | OUTPATIENT
Start: 2023-02-06

## 2023-02-15 ENCOUNTER — OFFICE VISIT (OUTPATIENT)
Dept: FAMILY MEDICINE CLINIC | Facility: CLINIC | Age: 53
End: 2023-02-15
Payer: COMMERCIAL

## 2023-02-15 VITALS
BODY MASS INDEX: 26.46 KG/M2 | TEMPERATURE: 99 F | OXYGEN SATURATION: 100 % | WEIGHT: 155 LBS | HEIGHT: 64 IN | RESPIRATION RATE: 16 BRPM | SYSTOLIC BLOOD PRESSURE: 140 MMHG | DIASTOLIC BLOOD PRESSURE: 79 MMHG | HEART RATE: 92 BPM

## 2023-02-15 DIAGNOSIS — R05.9 COUGH, UNSPECIFIED TYPE: ICD-10-CM

## 2023-02-15 DIAGNOSIS — J06.9 UPPER RESPIRATORY TRACT INFECTION, UNSPECIFIED TYPE: Primary | ICD-10-CM

## 2023-02-15 PROCEDURE — 3008F BODY MASS INDEX DOCD: CPT | Performed by: PHYSICIAN ASSISTANT

## 2023-02-15 PROCEDURE — 3078F DIAST BP <80 MM HG: CPT | Performed by: PHYSICIAN ASSISTANT

## 2023-02-15 PROCEDURE — 99213 OFFICE O/P EST LOW 20 MIN: CPT | Performed by: PHYSICIAN ASSISTANT

## 2023-02-15 PROCEDURE — 3077F SYST BP >= 140 MM HG: CPT | Performed by: PHYSICIAN ASSISTANT

## 2023-02-15 RX ORDER — ALBUTEROL SULFATE 90 UG/1
AEROSOL, METERED RESPIRATORY (INHALATION)
Qty: 1 EACH | Refills: 0 | Status: SHIPPED | OUTPATIENT
Start: 2023-02-15

## 2023-02-15 RX ORDER — BENZONATATE 200 MG/1
200 CAPSULE ORAL 3 TIMES DAILY PRN
Qty: 21 CAPSULE | Refills: 0 | Status: SHIPPED | OUTPATIENT
Start: 2023-02-15 | End: 2023-02-22

## 2023-02-16 ENCOUNTER — TELEPHONE (OUTPATIENT)
Dept: FAMILY MEDICINE CLINIC | Facility: CLINIC | Age: 53
End: 2023-02-16

## 2023-02-16 ENCOUNTER — PATIENT MESSAGE (OUTPATIENT)
Dept: FAMILY MEDICINE CLINIC | Facility: CLINIC | Age: 53
End: 2023-02-16

## 2023-02-16 LAB — SARS-COV-2 RNA RESP QL NAA+PROBE: DETECTED

## 2023-02-16 NOTE — TELEPHONE ENCOUNTER
Provided pt w/ instructions for Covid and treating symptomatically. From: Roger Britton  To: Javad Renee MD  Sent: 2/16/2023  3:29 PM CST  Subject: Covid    Hi i called today to let the office know i tested positive for covid  Swabbed yesterday-started feeling sick   Over the weekend  I [facility] at [facility] and was given albuterol puffers/tessalon perls  Im still coughinh but albuterol has helped a lot with congestion-i have low grade fever today  Is there anything else i need? I did not hear back from  thr person i spoke with at the office today.  Thank u

## 2023-02-16 NOTE — TELEPHONE ENCOUNTER
Patient was confirmed Positive yesterday for Covid  from the [facility]. They gave her Albuterol to use which she has done so now 3 times. She only has a headache, and some congestion and wanted to see if there's anything else she should be taking?     Please advise

## 2023-02-16 NOTE — TELEPHONE ENCOUNTER
Pt seen in [facility] 2/15 for cough & headache.  + for COVID. Given Rx for benzonatate & Albuterol. She was informed to let PCP know in case she needs anything else. LOV 1/16. Please advise on any other orders for pt or if pt should schedule virtual appt if no improvement?

## 2023-04-18 ENCOUNTER — PATIENT MESSAGE (OUTPATIENT)
Dept: FAMILY MEDICINE CLINIC | Facility: CLINIC | Age: 53
End: 2023-04-18

## 2023-04-18 DIAGNOSIS — H66.90 ACUTE OTITIS MEDIA, UNSPECIFIED OTITIS MEDIA TYPE: Primary | ICD-10-CM

## 2023-04-18 DIAGNOSIS — H69.93 EUSTACHIAN TUBE DISORDER, BILATERAL: ICD-10-CM

## 2023-04-19 RX ORDER — AMOXICILLIN AND CLAVULANATE POTASSIUM 875; 125 MG/1; MG/1
1 TABLET, FILM COATED ORAL 2 TIMES DAILY
Qty: 14 TABLET | Refills: 0 | Status: SHIPPED | OUTPATIENT
Start: 2023-04-19 | End: 2023-04-26

## 2023-04-19 RX ORDER — CIPROFLOXACIN AND DEXAMETHASONE 3; 1 MG/ML; MG/ML
SUSPENSION/ DROPS AURICULAR (OTIC)
Qty: 7.5 ML | Refills: 0 | OUTPATIENT
Start: 2023-04-19

## 2023-04-19 NOTE — TELEPHONE ENCOUNTER
Pt sent message c/o bilateral ear pain, clear discharge and swollen nodes behind ears. Pt saw Alesha Blankenship NP on 01/16 for same complaint and received ear drops. Pt took otc medications but is requesting for other medications. Please advise. Thank you.    LOV: 01/16/23

## 2023-04-19 NOTE — TELEPHONE ENCOUNTER
From: Arline Adamson  To: Marycarmen Dubon MD  Sent: 4/18/2023 4:51 PM CDT  Subject: Pain    Hi dr  Currently experiencing bilateral earache  Mild clear discharges both ears  None now   And swollen nodes behind my both ears  I saw Barak Rain for same problem and she gave me ear drops  It helped then   It started a few days ago   Is there anything i can take to relieve? And why is this recurring?   Thsnk you

## 2023-04-19 NOTE — TELEPHONE ENCOUNTER
From: Valentina Valles  To: MAYKEL Higgins  Sent: 4/18/2023 5:12 PM CDT  Subject: Refil    Please cancel request for refil ear drops sorry i still have some

## 2023-04-21 ENCOUNTER — PATIENT MESSAGE (OUTPATIENT)
Dept: FAMILY MEDICINE CLINIC | Facility: CLINIC | Age: 53
End: 2023-04-21

## 2023-04-21 NOTE — TELEPHONE ENCOUNTER
From: Venancio Velázquez  To: MAYKEL Carpio  Sent: 4/21/2023 9:57 AM CDT  Subject: Louis Luther    Hi ausra  Pain in my ears are better   However there is still swelling in mastoid area  I tried to get in see u today but theres non available so im seeing u monday at 8am.  I am flying thursday heading to [facility] so wsnted to be sure im good to go-also i know u offered before to get ct the previous ear infection i think but since it got better i did not folloe thru do i was eondering if this time it is needed so i can go get it done before i see u?   Thank u please Ashu Ch

## 2023-04-24 ENCOUNTER — OFFICE VISIT (OUTPATIENT)
Dept: FAMILY MEDICINE CLINIC | Facility: CLINIC | Age: 53
End: 2023-04-24
Payer: COMMERCIAL

## 2023-04-24 ENCOUNTER — APPOINTMENT (OUTPATIENT)
Dept: ULTRASOUND IMAGING | Facility: HOSPITAL | Age: 53
End: 2023-04-24
Attending: NURSE PRACTITIONER
Payer: COMMERCIAL

## 2023-04-24 ENCOUNTER — HOSPITAL ENCOUNTER (OUTPATIENT)
Dept: ULTRASOUND IMAGING | Age: 53
Discharge: HOME | End: 2023-04-24
Attending: NURSE PRACTITIONER
Payer: COMMERCIAL

## 2023-04-24 ENCOUNTER — HOSPITAL ENCOUNTER (OUTPATIENT)
Dept: GENERAL RADIOLOGY | Facility: HOSPITAL | Age: 53
End: 2023-04-24
Attending: NURSE PRACTITIONER
Payer: COMMERCIAL

## 2023-04-24 ENCOUNTER — PATIENT MESSAGE (OUTPATIENT)
Dept: FAMILY MEDICINE CLINIC | Facility: CLINIC | Age: 53
End: 2023-04-24

## 2023-04-24 ENCOUNTER — APPOINTMENT (OUTPATIENT)
Dept: ULTRASOUND IMAGING | Age: 53
End: 2023-04-24
Attending: NURSE PRACTITIONER
Payer: COMMERCIAL

## 2023-04-24 ENCOUNTER — TELEPHONE (OUTPATIENT)
Dept: FAMILY MEDICINE CLINIC | Facility: CLINIC | Age: 53
End: 2023-04-24

## 2023-04-24 VITALS
WEIGHT: 156 LBS | HEIGHT: 64 IN | SYSTOLIC BLOOD PRESSURE: 122 MMHG | DIASTOLIC BLOOD PRESSURE: 80 MMHG | OXYGEN SATURATION: 98 % | BODY MASS INDEX: 26.63 KG/M2 | RESPIRATION RATE: 16 BRPM | HEART RATE: 92 BPM

## 2023-04-24 DIAGNOSIS — R68.84 JAW PAIN, NON-TMJ: Primary | ICD-10-CM

## 2023-04-24 DIAGNOSIS — R68.84 JAW PAIN, NON-TMJ: ICD-10-CM

## 2023-04-24 PROCEDURE — 3079F DIAST BP 80-89 MM HG: CPT | Performed by: NURSE PRACTITIONER

## 2023-04-24 PROCEDURE — 76536 US EXAM OF HEAD AND NECK: CPT | Performed by: NURSE PRACTITIONER

## 2023-04-24 PROCEDURE — 3008F BODY MASS INDEX DOCD: CPT | Performed by: NURSE PRACTITIONER

## 2023-04-24 PROCEDURE — 3074F SYST BP LT 130 MM HG: CPT | Performed by: NURSE PRACTITIONER

## 2023-04-24 PROCEDURE — 99214 OFFICE O/P EST MOD 30 MIN: CPT | Performed by: NURSE PRACTITIONER

## 2023-04-24 RX ORDER — GLUCOSAMINE SULFATE 500 MG
CAPSULE ORAL
COMMUNITY
Start: 2014-10-06

## 2023-04-24 RX ORDER — MULTIVIT WITH MINERALS/LUTEIN
TABLET ORAL
COMMUNITY
Start: 2017-10-06

## 2023-04-24 RX ORDER — CHLORAL HYDRATE 500 MG
CAPSULE ORAL
COMMUNITY
Start: 2015-10-06

## 2023-04-24 RX ORDER — LORATADINE 10 MG/1
CAPSULE, LIQUID FILLED ORAL
COMMUNITY
Start: 2020-10-06

## 2023-04-24 NOTE — TELEPHONE ENCOUNTER
Patient is asking if we can change her XRAY order from today to be written as Plain Mandobile or TMJ due to their Machine being down. She has an 8pm appt also for an Ultrasound if we can possibly change it so she can get both done.   Please Advise

## 2023-04-24 NOTE — TELEPHONE ENCOUNTER
From: Jackson Hudson  To: MAYKEL Cade  Sent: 4/24/2023 5:18 PM CDT  Subject: Xray panoramic jaws    Hi Ernst Chrisjaye  I called the office to let u know and spoke to someone who will relay this message to u-  That this test was cancelled today and tomorrow   After being scheduled and rescheduled. the radiology tech stated that if the test can be re ordered as TMJ OR PLAIN MANDIBLE  It can get done at [place]. According to her its because of construction at [employer] why the machine is not availsble. Eris Spikes and if you do im sure u will change dx so insurance will pay.     Thank u so much just wanted to put it out here in message

## 2023-04-25 ENCOUNTER — TELEPHONE (OUTPATIENT)
Dept: FAMILY MEDICINE CLINIC | Facility: CLINIC | Age: 53
End: 2023-04-25

## 2023-04-25 DIAGNOSIS — H69.93 EUSTACHIAN TUBE DISORDER, BILATERAL: ICD-10-CM

## 2023-04-25 DIAGNOSIS — R68.84 JAW PAIN, NON-TMJ: Primary | ICD-10-CM

## 2023-04-25 DIAGNOSIS — R93.89 ABNORMAL FINDING ON IMAGING: ICD-10-CM

## 2023-04-25 NOTE — TELEPHONE ENCOUNTER
----- Message from MAYKEL Garcia sent at 4/25/2023  9:00 AM CDT -----  Reactive lymph nodes could be viral or bacterial or dental infection - can refer to ENT for further evaluation

## 2023-05-01 RX ORDER — LOSARTAN POTASSIUM 50 MG/1
TABLET ORAL
Qty: 90 TABLET | Refills: 1 | Status: SHIPPED | OUTPATIENT
Start: 2023-05-01

## 2023-05-05 ENCOUNTER — OFFICE VISIT (OUTPATIENT)
Facility: LOCATION | Age: 53
End: 2023-05-05
Payer: COMMERCIAL

## 2023-05-05 DIAGNOSIS — H60.61 CHRONIC NON-INFECTIVE OTITIS EXTERNA OF RIGHT EAR, UNSPECIFIED TYPE: Primary | ICD-10-CM

## 2023-05-05 PROCEDURE — 99204 OFFICE O/P NEW MOD 45 MIN: CPT | Performed by: OTOLARYNGOLOGY

## 2023-05-05 RX ORDER — FLUOCINOLONE ACETONIDE 0.11 MG/ML
3 OIL AURICULAR (OTIC) 3 TIMES DAILY
Qty: 20 ML | Refills: 0 | Status: SHIPPED | OUTPATIENT
Start: 2023-05-05 | End: 2023-05-12

## 2023-05-11 ENCOUNTER — PATIENT MESSAGE (OUTPATIENT)
Dept: FAMILY MEDICINE CLINIC | Facility: CLINIC | Age: 53
End: 2023-05-11

## 2023-05-12 NOTE — TELEPHONE ENCOUNTER
From: Dorothy Fang  To: Cheri Mccoy MD  Sent: 5/11/2023 5:14 PM CDT  Subject: Wrist and ankle    Hi Dr Vicente Mccallum  4/30 my left wrist started hurting i thought i slept on it but now pain thumb area to radial area/to make matters worst this monday 5/8 i almost fell going down stairs /hurt my right ankle  Did not swell until bedtime monday night  I can still walk on it amd have been icing and ace wrapping.  I was just wondering if it can be xrayed to b sure i didnt fracture my ankle and wrist?  I waited few days hoping it would go away but  Didnt    Please advice thank u

## 2023-05-15 ENCOUNTER — MED REC SCAN ONLY (OUTPATIENT)
Dept: FAMILY MEDICINE CLINIC | Facility: CLINIC | Age: 53
End: 2023-05-15

## 2023-05-31 ENCOUNTER — OFFICE VISIT (OUTPATIENT)
Dept: FAMILY MEDICINE CLINIC | Facility: CLINIC | Age: 53
End: 2023-05-31
Payer: COMMERCIAL

## 2023-05-31 ENCOUNTER — HOSPITAL ENCOUNTER (OUTPATIENT)
Dept: GENERAL RADIOLOGY | Age: 53
Discharge: HOME | End: 2023-05-31
Attending: NURSE PRACTITIONER
Payer: COMMERCIAL

## 2023-05-31 VITALS
HEIGHT: 64 IN | RESPIRATION RATE: 16 BRPM | SYSTOLIC BLOOD PRESSURE: 120 MMHG | BODY MASS INDEX: 27.49 KG/M2 | WEIGHT: 161 LBS | HEART RATE: 76 BPM | OXYGEN SATURATION: 98 % | DIASTOLIC BLOOD PRESSURE: 84 MMHG

## 2023-05-31 DIAGNOSIS — M65.9 TENOSYNOVITIS OF THUMB: ICD-10-CM

## 2023-05-31 DIAGNOSIS — H60.63 CHRONIC OTITIS EXTERNA OF BOTH EARS, UNSPECIFIED TYPE: Primary | ICD-10-CM

## 2023-05-31 DIAGNOSIS — S93.491A SPRAIN OF ANTERIOR TALOFIBULAR LIGAMENT OF RIGHT ANKLE, INITIAL ENCOUNTER: ICD-10-CM

## 2023-05-31 PROCEDURE — 3008F BODY MASS INDEX DOCD: CPT | Performed by: NURSE PRACTITIONER

## 2023-05-31 PROCEDURE — 3074F SYST BP LT 130 MM HG: CPT | Performed by: NURSE PRACTITIONER

## 2023-05-31 PROCEDURE — 87186 SC STD MICRODIL/AGAR DIL: CPT | Performed by: NURSE PRACTITIONER

## 2023-05-31 PROCEDURE — 73610 X-RAY EXAM OF ANKLE: CPT | Performed by: NURSE PRACTITIONER

## 2023-05-31 PROCEDURE — 87205 SMEAR GRAM STAIN: CPT | Performed by: NURSE PRACTITIONER

## 2023-05-31 PROCEDURE — 87077 CULTURE AEROBIC IDENTIFY: CPT | Performed by: NURSE PRACTITIONER

## 2023-05-31 PROCEDURE — 99214 OFFICE O/P EST MOD 30 MIN: CPT | Performed by: NURSE PRACTITIONER

## 2023-05-31 PROCEDURE — 3079F DIAST BP 80-89 MM HG: CPT | Performed by: NURSE PRACTITIONER

## 2023-05-31 PROCEDURE — 87070 CULTURE OTHR SPECIMN AEROBIC: CPT | Performed by: NURSE PRACTITIONER

## 2023-05-31 RX ORDER — DOXYCYCLINE HYCLATE 100 MG/1
100 CAPSULE ORAL 2 TIMES DAILY
Qty: 60 CAPSULE | Refills: 1 | Status: SHIPPED | OUTPATIENT
Start: 2023-05-31 | End: 2023-06-30

## 2023-05-31 RX ORDER — METHYLPREDNISOLONE 4 MG/1
TABLET ORAL
Qty: 21 EACH | Refills: 0 | Status: SHIPPED | OUTPATIENT
Start: 2023-05-31

## 2023-05-31 RX ORDER — DICLOFENAC SODIUM 75 MG/1
75 TABLET, DELAYED RELEASE ORAL 2 TIMES DAILY
Qty: 60 TABLET | Refills: 0 | Status: SHIPPED | OUTPATIENT
Start: 2023-05-31 | End: 2023-06-30

## 2023-05-31 RX ORDER — CIPROFLOXACIN AND DEXAMETHASONE 3; 1 MG/ML; MG/ML
SUSPENSION/ DROPS AURICULAR (OTIC)
Qty: 7.5 ML | Refills: 0 | Status: SHIPPED | OUTPATIENT
Start: 2023-05-31

## 2023-06-06 ENCOUNTER — TELEPHONE (OUTPATIENT)
Dept: FAMILY MEDICINE CLINIC | Facility: CLINIC | Age: 53
End: 2023-06-06

## 2023-06-06 ENCOUNTER — PATIENT MESSAGE (OUTPATIENT)
Dept: FAMILY MEDICINE CLINIC | Facility: CLINIC | Age: 53
End: 2023-06-06

## 2023-06-06 NOTE — TELEPHONE ENCOUNTER
Patient has not read the Zaarly message. Left detailed message with below lab results and recommendation.

## 2023-06-07 NOTE — TELEPHONE ENCOUNTER
From: Kya Mock  To: MAYKEL Moore  Sent: 6/6/2023 5:52 PM CDT  Subject: Question regarding AEROBIC BACTERIAL CULTURE    Thank you seun garcia gpt ur daniella Hutton i missed ur call  Ty

## 2023-06-07 NOTE — TELEPHONE ENCOUNTER
From: Marcelo Daugherty  To: Roselinda Angelucci, APRN  Sent: 6/6/2023 5:51 PM CDT  Subject: Bactrim    Josué bergeron i just got ur vm  I was wondering what the medication was from [employer] i got a text that a prescription is ready but never followed up  I understand the directions  Thank u

## 2023-06-15 ENCOUNTER — TELEPHONE (OUTPATIENT)
Dept: PHYSICAL THERAPY | Facility: HOSPITAL | Age: 53
End: 2023-06-15

## 2023-06-16 ENCOUNTER — PATIENT MESSAGE (OUTPATIENT)
Dept: FAMILY MEDICINE CLINIC | Facility: CLINIC | Age: 53
End: 2023-06-16

## 2023-06-16 DIAGNOSIS — H60.63 CHRONIC OTITIS EXTERNA OF BOTH EARS, UNSPECIFIED TYPE: Primary | ICD-10-CM

## 2023-06-16 DIAGNOSIS — H69.93 EUSTACHIAN TUBE DISORDER, BILATERAL: ICD-10-CM

## 2023-06-16 NOTE — TELEPHONE ENCOUNTER
Pt with continued unbearable Lt. Ear pain, crusty but no drainage, nauseous. LOV 5/31. Labs revealed Staph. Completed course of Bactrim. Ausra recommended MRI, pt. Declined. But now requesting. Please advise.

## 2023-06-16 NOTE — TELEPHONE ENCOUNTER
Yes but I think she can see ENT first, they will know what she needs, ok to see   ENT group:      Dr. Banks [facility]  Dr.R. Claire [address] [place] [address]  [id number] [place] [address],   [address]  [place] 6/15/22)   Rafi, [address] [place] [address]          [phone].                             Or [facility] Lo team.

## 2023-06-20 ENCOUNTER — OFFICE VISIT (OUTPATIENT)
Dept: OCCUPATIONAL MEDICINE | Age: 53
End: 2023-06-20
Attending: NURSE PRACTITIONER
Payer: COMMERCIAL

## 2023-06-20 DIAGNOSIS — M65.9 TENOSYNOVITIS OF THUMB: ICD-10-CM

## 2023-06-20 PROCEDURE — 97530 THERAPEUTIC ACTIVITIES: CPT

## 2023-06-20 PROCEDURE — 97165 OT EVAL LOW COMPLEX 30 MIN: CPT

## 2023-06-23 ENCOUNTER — OFFICE VISIT (OUTPATIENT)
Dept: OCCUPATIONAL MEDICINE | Age: 53
End: 2023-06-23
Attending: NURSE PRACTITIONER
Payer: COMMERCIAL

## 2023-06-23 PROCEDURE — 97110 THERAPEUTIC EXERCISES: CPT

## 2023-06-23 PROCEDURE — 97760 ORTHOTIC MGMT&TRAING 1ST ENC: CPT

## 2023-06-23 PROCEDURE — 97140 MANUAL THERAPY 1/> REGIONS: CPT

## 2023-06-27 ENCOUNTER — OFFICE VISIT (OUTPATIENT)
Dept: OCCUPATIONAL MEDICINE | Age: 53
End: 2023-06-27
Attending: NURSE PRACTITIONER
Payer: COMMERCIAL

## 2023-06-27 PROCEDURE — 97140 MANUAL THERAPY 1/> REGIONS: CPT

## 2023-06-27 PROCEDURE — 97035 APP MDLTY 1+ULTRASOUND EA 15: CPT

## 2023-06-27 PROCEDURE — 97110 THERAPEUTIC EXERCISES: CPT

## 2023-07-07 ENCOUNTER — OFFICE VISIT (OUTPATIENT)
Dept: OCCUPATIONAL MEDICINE | Age: 53
End: 2023-07-07
Attending: NURSE PRACTITIONER
Payer: COMMERCIAL

## 2023-07-07 PROCEDURE — 97760 ORTHOTIC MGMT&TRAING 1ST ENC: CPT

## 2023-07-07 PROCEDURE — 97110 THERAPEUTIC EXERCISES: CPT

## 2023-07-07 PROCEDURE — 97140 MANUAL THERAPY 1/> REGIONS: CPT

## 2023-07-07 NOTE — PROGRESS NOTES
Diagnosis:   Tenosynovitis of L thumb (M65.9)   Referring Provider: Ghassan Edmonds  Date of Evaluation:    6/20/23    Precautions:  None Next MD visit:   none scheduled  Date of Surgery: n/a   Insurance Primary/Secondary: [place] / N/A     # Auth Visits: 8           Subjective: \"It has been so painful this past week\" Pt. reports pain in L thumb was 10/10 on Monday of this week (7/3/23), she could not open a door knob. Pt. reports Kinesiotape to 1st dorsal compartment has helped with pain relief. Pt. reported she was able to hold a kiwi this morning with L hand to peel it with 2/10 pain. Pt. Reports going to the gym is frustrating for her as she cannot use her L hand. Pain: 2-3/10 at beginning of session. Slight pulling sensation reported in radial thumb area when completing end range of palmar abduction. Objective:    D1 RA: Limited ROM to end range due to slight pain; modified to lift as far as she can tolerate. MMT to L wrist: 5/5 extension, 5/5 flexion. No pain reported       Assessment: Thumb spica splint constructed for pt. to stabilize L thumb as pt. experienced increase in pain since last visit. Heat pack applied to 1st dorsal compartment for 5 mins to increase fluidity of L thumb joint for functional use. Tolerated light ROM thumb exercises throughout session. Informed for pt. to wear splint during daily activities and during sleep; informed of splint precautions and proper care. Pt. informed of heat and ice modality to use PRN. Pain well controlled throughout session. Goals:   (to be met in 8 visits)     Pt will carry a 5lb grocery bag c L hand with 2/10 pain or less. Pt will hold Ipad/phone in L hand for 10 minutes with 2/10 pain or less. Progressing  Pt will carry pot/pan at least 5 feet with 2/10 pain or less in L hand for cooking preparation tasks. Pt will transfer dishes in sink using L hand with no pain to increase ability to complete household chores.   Pt will be appropriately fitted with a thumb spica splint to be used during ADL and sleep PRN. Progressing  Pt will be independent and compliant with comprehensive HEP to maintain progress achieved in OT    Plan: Continue OT 2x/week or a total of 8 visits; stretching, light strengthening, modalities PRN  Date: 6/23/2023  TX#: 2/8 Date: 6/27/2023                 TX#: 3/8 Date:  7/72023               TX#: 4/8 Date:                 TX#: 5/ Date: Tx#: 6/   AROM thumb circles  -Counter clockwise 20x  -Clockwise 20x  -RA reaches to D5 20x  -PA 20x Ultrasound to 1st dorsal compartment 1.2 Fuller/cm2, 50%, 3 mhz, 8 min    -Radial abduction stretch 20x  -Finger opposition (thumb to pinky) 20x  -Thumb lifts w/ palm facing upwards 20x  -Self PROM to thumb 10x Heat pack modality to L hand/1st dorsal compartment (5 min)    -Palmar abduction stretch 20x  -Thumb circles (modified to small circles due to pain stretching to end range of palmar abduction  -Counter clockwise 20x  -Clockwise 20x       In-hand manipulation with bingo chips into sectioned container (24 pieces) Wrist ROM w/ pink foam block   Wrist flexion 20x  Wrist extension 20x  RD & UD 20x Light tissue massage to L 1st dorsal compartment  L Thumb opposition to base of L D5 20x     Light tissue massage to L thumb area Yellow clothespins  Tripod Pinch-7x  Lateral Pinch-7x (discomfort completing lateral pinch, discontinued ex.)  Tripod Pinch w/ yellow sponge into cup (15 pieces) Thumb alphabet     Fabricated thumb portion of splint to be used PRN Yellow theraband ball  -Light  squeezes 20x Fabricated thumb spica splint to be used daily     HEP: Additional items listed in bold.     Charges: 1 MT, 1 Ortho Manage, 1 TE    Total Timed Treatment: 45 min  Total Treatment Time: 45 min

## 2023-07-11 ENCOUNTER — APPOINTMENT (OUTPATIENT)
Dept: OCCUPATIONAL MEDICINE | Age: 53
End: 2023-07-11
Attending: NURSE PRACTITIONER
Payer: COMMERCIAL

## 2023-07-13 ENCOUNTER — APPOINTMENT (OUTPATIENT)
Dept: OCCUPATIONAL MEDICINE | Age: 53
End: 2023-07-13
Attending: NURSE PRACTITIONER
Payer: COMMERCIAL

## 2023-07-18 ENCOUNTER — APPOINTMENT (OUTPATIENT)
Dept: OCCUPATIONAL MEDICINE | Age: 53
End: 2023-07-18
Attending: NURSE PRACTITIONER
Payer: COMMERCIAL

## 2023-07-21 ENCOUNTER — OFFICE VISIT (OUTPATIENT)
Dept: OCCUPATIONAL MEDICINE | Age: 53
End: 2023-07-21
Attending: NURSE PRACTITIONER
Payer: COMMERCIAL

## 2023-07-21 PROCEDURE — 97035 APP MDLTY 1+ULTRASOUND EA 15: CPT

## 2023-07-21 PROCEDURE — 97110 THERAPEUTIC EXERCISES: CPT

## 2023-07-21 NOTE — PROGRESS NOTES
Diagnosis:   Tenosynovitis of L thumb (M65.9)   Referring Provider: Christian Market  Date of Evaluation:    6/20/23    Precautions:  None Next MD visit:   none scheduled  Date of Surgery: n/a   Insurance Primary/Secondary: [place] / N/A     # Auth Visits: 8           Subjective: \"The pain is there, mainly just in the thumb but its not bad\". Pt states she has been wearing her brace daily, and said she is able to hold her phone for longer periods of time. Pt reports kinesiotape has really been helpful with pain relief. Pain:  1-2/10 at beginning of session. Objective:    L D1 RA: 55  L D1 PA: 56    L MP: 60  L IP: 40    D1 RA: Limited ROM to end range due to slight pain; modified to lift as far as she can tolerate. MMT to L wrist: 5/5 extension, 5/5 flexion. No pain reported       Assessment: Pt completed resistive exercises to L thumb during today's session; completed exercises with little to no pain, though pt experienced a slight pulling sensation along 1st dorsal compartment and advised to modify exercise. Informed pt. to continue with wearing splint PRN. Provided pt c joint protection handout to increase awareness of modifying ADLs to prevent further injury. Kinesiotape applied to 1st dorsal compartment at end of session for pain relief. Rubber band thumb exercises added to HEP. Pain well controlled throughout session. Goals:   (to be met in 8 visits)     Pt will carry a 5lb grocery bag c L hand with 2/10 pain or less. Progressing  Pt will hold Ipad/phone in L hand for 10 minutes with 2/10 pain or less. Progressing  Pt will carry pot/pan at least 5 feet with 2/10 pain or less in L hand for cooking preparation tasks. Progressing  Pt will transfer dishes in sink using L hand with no pain to increase ability to complete household chores. Progressing  Pt will be appropriately fitted with a thumb spica splint to be used during ADL and sleep PRN.  Met  Pt will be independent and compliant with comprehensive HEP to maintain progress achieved in OT    Plan: Continue OT 2x/week or a total of 8 visits; stretching, light strengthening, modalities PRN  Date: 6/23/2023  TX#: 2/8 Date: 6/27/2023                 TX#: 3/8 Date:  7/72023               TX#: 4/8 Date:  7/21/2023               TX#: 5/8 Date: Tx#: 6/   AROM thumb circles  -Counter clockwise 20x  -Clockwise 20x  -RA reaches to D5 20x  -PA 20x Ultrasound to 1st dorsal compartment 1.2 Fuller/cm2, 50%, 3 mhz, 8 min    -Radial abduction stretch 20x  -Finger opposition (thumb to pinky) 20x  -Thumb lifts w/ palm facing upwards 20x  -Self PROM to thumb 10x Heat pack modality to L hand/1st dorsal compartment (5 min)    -Palmar abduction stretch 20x  -Thumb circles (modified to small circles due to pain stretching to end range of palmar abduction  -Counter clockwise 20x  -Clockwise 20x   Ultrasound to 1st dorsal compartment 1.2 W/cm2, 50%, 3 mhz, 8 min  Thumb ROM  -Thumb alphabet  -Numbers 1-20    Resisted Thumb Exercises w/ rubberband  -Rubber band extension (Big C, little C) 15x  -Thumb extension 15x  -Resisted thumb flexion 15x  -1st dorsal interosseous strengthening 15x  -Thumb opposition to small finger 15x  -Thumb palmar abduction 15x    In-hand manipulation with bingo chips into sectioned container (24 pieces) Wrist ROM w/ pink foam block   Wrist flexion 20x  Wrist extension 20x  RD & UD 20x Light tissue massage to L 1st dorsal compartment  L Thumb opposition to base of L D5 20x Tearing paper towel utilizing L thumb as tearing thumb.     Light tissue massage to L thumb area Yellow clothespins  Tripod Pinch-7x  Lateral Pinch-7x (discomfort completing lateral pinch, discontinued ex.)  Tripod Pinch w/ yellow sponge into cup (15 pieces) Thumb alphabet Joint protection handout    Fabricated thumb portion of splint to be used PRN Yellow theraband ball  -Light  squeezes 20x Fabricated thumb spica splint to be used daily Kinesiotape to L 1st dorsal compartment    HEP: Additional items listed in bold.     Charges: 1 Ultrasound, 2 TE  Total Timed Treatment: 45 min  Total Treatment Time: 45 min

## 2023-07-22 ENCOUNTER — PATIENT MESSAGE (OUTPATIENT)
Dept: FAMILY MEDICINE CLINIC | Facility: CLINIC | Age: 53
End: 2023-07-22

## 2023-07-22 DIAGNOSIS — M65.9 TENOSYNOVITIS OF THUMB: Primary | ICD-10-CM

## 2023-07-22 NOTE — TELEPHONE ENCOUNTER
From: Earlean Harada  To:  Celena Hurt MD  Sent: 7/22/2023 10:47 AM CDT  Subject: Tendonitis    Hi dr Joanne Mayorga  I completed 6 OT sessions and left wridt hand function has some improvements but pain in thumb shooting to wrist remains  I was told by Mary Walker that hudrocortidone injection may help and therfore i am requesting a referral to hand ortho so i can get this pain and function controlled    Thank you-i cancelled my 2 remaining OT Appts as there has not been a significant change in function and pain

## 2023-07-23 NOTE — TELEPHONE ENCOUNTER
Yes:          [facility]:    Dr. Simpson Flight: hand and upper extremity. Orthopedic Surgery  [address] [facility], [address] [place] [address], [address]   [address]  [address] [place], [place]   Tel:[phone]

## 2023-07-25 ENCOUNTER — TELEPHONE (OUTPATIENT)
Dept: ORTHOPEDICS CLINIC | Facility: CLINIC | Age: 53
End: 2023-07-25

## 2023-07-25 ENCOUNTER — APPOINTMENT (OUTPATIENT)
Dept: OCCUPATIONAL MEDICINE | Age: 53
End: 2023-07-25
Attending: NURSE PRACTITIONER
Payer: COMMERCIAL

## 2023-07-25 ENCOUNTER — PATIENT MESSAGE (OUTPATIENT)
Dept: ORTHOPEDICS CLINIC | Facility: CLINIC | Age: 53
End: 2023-07-25

## 2023-07-25 DIAGNOSIS — M79.642 LEFT HAND PAIN: Primary | ICD-10-CM

## 2023-07-25 NOTE — TELEPHONE ENCOUNTER
Patient has an upcoming appt for lt wrist / thumb. At this time patient has not had any imaging done, please place RX accordingly. I have notified the patient to come in 20-30 min prior to appointment time to have the imaging done . Please forward to the  pool to schedule imaging. Thank you.       Future Appointments   Date Time Provider Leonila Josefa   8/14/2023  2:20 PM Rafael Valencia [id number]   8/28/2023  9:40 AM Vicki Ahmadi MD [facility]

## 2023-07-28 ENCOUNTER — APPOINTMENT (OUTPATIENT)
Dept: OCCUPATIONAL MEDICINE | Age: 53
End: 2023-07-28
Attending: NURSE PRACTITIONER
Payer: COMMERCIAL

## 2023-08-02 DIAGNOSIS — M65.9 TENOSYNOVITIS OF THUMB: ICD-10-CM

## 2023-08-03 RX ORDER — DICLOFENAC SODIUM 75 MG/1
75 TABLET, DELAYED RELEASE ORAL 2 TIMES DAILY
Qty: 60 TABLET | Refills: 0 | Status: SHIPPED | OUTPATIENT
Start: 2023-08-03 | End: 2023-09-02

## 2023-08-14 ENCOUNTER — HOSPITAL ENCOUNTER (OUTPATIENT)
Dept: GENERAL RADIOLOGY | Age: 53
Discharge: HOME | End: 2023-08-14
Attending: PHYSICIAN ASSISTANT
Payer: COMMERCIAL

## 2023-08-14 ENCOUNTER — OFFICE VISIT (OUTPATIENT)
Dept: ORTHOPEDICS CLINIC | Facility: CLINIC | Age: 53
End: 2023-08-14
Payer: COMMERCIAL

## 2023-08-14 VITALS — BODY MASS INDEX: 27.49 KG/M2 | WEIGHT: 161 LBS | HEIGHT: 64 IN

## 2023-08-14 DIAGNOSIS — M79.642 LEFT HAND PAIN: ICD-10-CM

## 2023-08-14 DIAGNOSIS — M65.4 TENOSYNOVITIS OF RADIAL STYLOID: Primary | ICD-10-CM

## 2023-08-14 PROCEDURE — 73130 X-RAY EXAM OF HAND: CPT | Performed by: PHYSICIAN ASSISTANT

## 2023-08-14 RX ORDER — BETAMETHASONE SODIUM PHOSPHATE AND BETAMETHASONE ACETATE 3; 3 MG/ML; MG/ML
6 INJECTION, SUSPENSION INTRA-ARTICULAR; INTRALESIONAL; INTRAMUSCULAR; SOFT TISSUE ONCE
Status: COMPLETED | OUTPATIENT
Start: 2023-08-14 | End: 2023-08-14

## 2023-08-14 RX ADMIN — BETAMETHASONE SODIUM PHOSPHATE AND BETAMETHASONE ACETATE 6 MG: 3; 3 INJECTION, SUSPENSION INTRA-ARTICULAR; INTRALESIONAL; INTRAMUSCULAR; SOFT TISSUE at 14:22:00

## 2023-09-05 ENCOUNTER — PATIENT MESSAGE (OUTPATIENT)
Dept: FAMILY MEDICINE CLINIC | Facility: CLINIC | Age: 53
End: 2023-09-05

## 2023-09-05 DIAGNOSIS — H60.63 CHRONIC OTITIS EXTERNA OF BOTH EARS, UNSPECIFIED TYPE: Primary | ICD-10-CM

## 2023-09-05 RX ORDER — DOXYCYCLINE HYCLATE 100 MG/1
100 CAPSULE ORAL 2 TIMES DAILY
Qty: 14 CAPSULE | Refills: 0 | Status: SHIPPED | OUTPATIENT
Start: 2023-09-05 | End: 2023-09-12

## 2023-09-05 NOTE — TELEPHONE ENCOUNTER
From: Brenda Gallegos  To: MAYKEL Wills  Sent: 9/5/2023 6:14 AM CDT  Subject: Right ear    Hi Mercedez Suarez  So after a months worth of antobiotics  I would say 3 weeks ago  My right ear is red again  Started drsinjng clear liquid few days back  Yesterday i noticed lymph nodes swollen behind right ear  I have followed precautiond and after seeing ent and dentist they did not see anything  The dentist did a 4d xray i believe  I started doing the ear drops yesterday and plugged my ears during showers  I dont know what else to do there is mild pressure /discomfort in the right ear  No fever or other discomfort other than i noticed that when it happens it is after eczema shows up on my upper rt eye and rt upper cheek anabell  Im trying to figure out why i get that using same face and hair products but will change the shampoo  Sorry so long if i can get some meds to hekp me with this   I will be seeing dr Irina Jonas in 3 weeks anyways for my yearly physical thank you

## 2023-09-15 ENCOUNTER — MED REC SCAN ONLY (OUTPATIENT)
Dept: FAMILY MEDICINE CLINIC | Facility: CLINIC | Age: 53
End: 2023-09-15

## 2023-09-30 ENCOUNTER — OFFICE VISIT (OUTPATIENT)
Dept: FAMILY MEDICINE CLINIC | Facility: CLINIC | Age: 53
End: 2023-09-30
Payer: COMMERCIAL

## 2023-09-30 VITALS
RESPIRATION RATE: 16 BRPM | HEIGHT: 64 IN | HEART RATE: 99 BPM | DIASTOLIC BLOOD PRESSURE: 80 MMHG | WEIGHT: 151 LBS | OXYGEN SATURATION: 98 % | BODY MASS INDEX: 25.78 KG/M2 | SYSTOLIC BLOOD PRESSURE: 118 MMHG

## 2023-09-30 DIAGNOSIS — Z13.228 SCREENING FOR ENDOCRINE, NUTRITIONAL, METABOLIC AND IMMUNITY DISORDER: ICD-10-CM

## 2023-09-30 DIAGNOSIS — Z12.11 SCREENING FOR COLON CANCER: ICD-10-CM

## 2023-09-30 DIAGNOSIS — Z13.0 SCREENING FOR ENDOCRINE, NUTRITIONAL, METABOLIC AND IMMUNITY DISORDER: ICD-10-CM

## 2023-09-30 DIAGNOSIS — Z13.21 SCREENING FOR ENDOCRINE, NUTRITIONAL, METABOLIC AND IMMUNITY DISORDER: ICD-10-CM

## 2023-09-30 DIAGNOSIS — Z00.00 ROUTINE GENERAL MEDICAL EXAMINATION AT A HEALTH CARE FACILITY: Primary | ICD-10-CM

## 2023-09-30 DIAGNOSIS — Z13.29 SCREENING FOR ENDOCRINE, NUTRITIONAL, METABOLIC AND IMMUNITY DISORDER: ICD-10-CM

## 2023-09-30 DIAGNOSIS — Z23 NEED FOR VACCINATION: ICD-10-CM

## 2023-09-30 DIAGNOSIS — Z23 NEED FOR SHINGLES VACCINE: ICD-10-CM

## 2023-09-30 DIAGNOSIS — Z12.31 ENCOUNTER FOR SCREENING MAMMOGRAM FOR MALIGNANT NEOPLASM OF BREAST: ICD-10-CM

## 2023-09-30 PROCEDURE — 99396 PREV VISIT EST AGE 40-64: CPT | Performed by: FAMILY MEDICINE

## 2023-09-30 PROCEDURE — 90686 IIV4 VACC NO PRSV 0.5 ML IM: CPT | Performed by: FAMILY MEDICINE

## 2023-09-30 PROCEDURE — 3074F SYST BP LT 130 MM HG: CPT | Performed by: FAMILY MEDICINE

## 2023-09-30 PROCEDURE — 3008F BODY MASS INDEX DOCD: CPT | Performed by: FAMILY MEDICINE

## 2023-09-30 PROCEDURE — 90750 HZV VACC RECOMBINANT IM: CPT | Performed by: FAMILY MEDICINE

## 2023-09-30 PROCEDURE — 90471 IMMUNIZATION ADMIN: CPT | Performed by: FAMILY MEDICINE

## 2023-09-30 PROCEDURE — 90472 IMMUNIZATION ADMIN EACH ADD: CPT | Performed by: FAMILY MEDICINE

## 2023-09-30 PROCEDURE — 3079F DIAST BP 80-89 MM HG: CPT | Performed by: FAMILY MEDICINE

## 2023-09-30 RX ORDER — LOSARTAN POTASSIUM 50 MG/1
50 TABLET ORAL DAILY
Qty: 90 TABLET | Refills: 3 | Status: SHIPPED | OUTPATIENT
Start: 2023-09-30

## 2023-09-30 NOTE — PATIENT INSTRUCTIONS
Glen Stearns M.D. [facility]       [facility]  [address], [address] [place] [address]    Phone: [phone]  Fax: [phone].

## 2023-10-03 ENCOUNTER — PATIENT MESSAGE (OUTPATIENT)
Dept: FAMILY MEDICINE CLINIC | Facility: CLINIC | Age: 53
End: 2023-10-03

## 2023-10-03 DIAGNOSIS — R73.03 PREDIABETES: Primary | ICD-10-CM

## 2023-10-03 RX ORDER — HYDROCORTISONE AND ACETIC ACID 20.75; 10.375 MG/ML; MG/ML
3 SOLUTION AURICULAR (OTIC) 3 TIMES DAILY
Qty: 10 ML | Refills: 0 | Status: SHIPPED | OUTPATIENT
Start: 2023-10-03

## 2023-10-03 NOTE — TELEPHONE ENCOUNTER
From: [facility]  To: Allean Sicard  Sent: 10/3/2023 9:23 AM CDT  Subject: Hi     Just wondering if my aic can be included in [facility] blood draw? I had it done last year and it was pre diabetic i just would like to kmow where im at  Also  My ears are starting to hurt-again  Left ear started yesterday  And now rt ear  I still have the ear drops from before so ill get that going? I have been on many antibiotics amd saw ent  A  They didnt find anything anabell why it keeps on recurring?   Please help /lmk and comfort gracia

## 2023-10-04 ENCOUNTER — LAB ENCOUNTER (OUTPATIENT)
Dept: LAB | Age: 53
End: 2023-10-04
Attending: FAMILY MEDICINE
Payer: COMMERCIAL

## 2023-10-04 DIAGNOSIS — R73.03 PREDIABETES: ICD-10-CM

## 2023-10-04 DIAGNOSIS — Z13.228 SCREENING FOR ENDOCRINE, NUTRITIONAL, METABOLIC AND IMMUNITY DISORDER: ICD-10-CM

## 2023-10-04 DIAGNOSIS — Z13.21 SCREENING FOR ENDOCRINE, NUTRITIONAL, METABOLIC AND IMMUNITY DISORDER: ICD-10-CM

## 2023-10-04 DIAGNOSIS — Z13.29 SCREENING FOR ENDOCRINE, NUTRITIONAL, METABOLIC AND IMMUNITY DISORDER: ICD-10-CM

## 2023-10-04 DIAGNOSIS — Z13.0 SCREENING FOR ENDOCRINE, NUTRITIONAL, METABOLIC AND IMMUNITY DISORDER: ICD-10-CM

## 2023-10-04 LAB
ALBUMIN SERPL-MCNC: 4.2 G/DL (ref 3.4–5)
ALBUMIN/GLOB SERPL: 1.1 {RATIO} (ref 1–2)
ALP LIVER SERPL-CCNC: 81 U/L
ALT SERPL-CCNC: 55 U/L
ANION GAP SERPL CALC-SCNC: 8 MMOL/L (ref 0–18)
AST SERPL-CCNC: 29 U/L (ref 15–37)
BASOPHILS # BLD AUTO: 0.04 X10(3) UL (ref 0–0.2)
BASOPHILS NFR BLD AUTO: 1 %
BILIRUB SERPL-MCNC: 0.2 MG/DL (ref 0.1–2)
BUN BLD-MCNC: 11 MG/DL (ref 7–18)
CALCIUM BLD-MCNC: 9.7 MG/DL (ref 8.5–10.1)
CHLORIDE SERPL-SCNC: 106 MMOL/L (ref 98–112)
CHOLEST SERPL-MCNC: 243 MG/DL (ref ?–200)
CO2 SERPL-SCNC: 25 MMOL/L (ref 21–32)
CREAT BLD-MCNC: 0.94 MG/DL
EGFRCR SERPLBLD CKD-EPI 2021: 72 ML/MIN/1.73M2 (ref 60–?)
EOSINOPHIL # BLD AUTO: 0.04 X10(3) UL (ref 0–0.7)
EOSINOPHIL NFR BLD AUTO: 1 %
ERYTHROCYTE [DISTWIDTH] IN BLOOD BY AUTOMATED COUNT: 12 %
FASTING PATIENT LIPID ANSWER: YES
FASTING STATUS PATIENT QL REPORTED: YES
GLOBULIN PLAS-MCNC: 4 G/DL (ref 2.8–4.4)
GLUCOSE BLD-MCNC: 96 MG/DL (ref 70–99)
HCT VFR BLD AUTO: 39.3 %
HDLC SERPL-MCNC: 56 MG/DL (ref 40–59)
HGB BLD-MCNC: 13 G/DL
IMM GRANULOCYTES # BLD AUTO: 0.01 X10(3) UL (ref 0–1)
IMM GRANULOCYTES NFR BLD: 0.2 %
LDLC SERPL CALC-MCNC: 168 MG/DL (ref ?–100)
LYMPHOCYTES # BLD AUTO: 1.18 X10(3) UL (ref 1–4)
LYMPHOCYTES NFR BLD AUTO: 28.4 %
MCH RBC QN AUTO: 30.5 PG (ref 26–34)
MCHC RBC AUTO-ENTMCNC: 33.1 G/DL (ref 31–37)
MCV RBC AUTO: 92.3 FL
MONOCYTES # BLD AUTO: 0.31 X10(3) UL (ref 0.1–1)
MONOCYTES NFR BLD AUTO: 7.5 %
NEUTROPHILS # BLD AUTO: 2.58 X10 (3) UL (ref 1.5–7.7)
NEUTROPHILS # BLD AUTO: 2.58 X10(3) UL (ref 1.5–7.7)
NEUTROPHILS NFR BLD AUTO: 61.9 %
NONHDLC SERPL-MCNC: 187 MG/DL (ref ?–130)
OSMOLALITY SERPL CALC.SUM OF ELEC: 287 MOSM/KG (ref 275–295)
PLATELET # BLD AUTO: 238 10(3)UL (ref 150–450)
POTASSIUM SERPL-SCNC: 4.5 MMOL/L (ref 3.5–5.1)
PROT SERPL-MCNC: 8.2 G/DL (ref 6.4–8.2)
RBC # BLD AUTO: 4.26 X10(6)UL
SODIUM SERPL-SCNC: 139 MMOL/L (ref 136–145)
TRIGL SERPL-MCNC: 107 MG/DL (ref 30–149)
TSI SER-ACNC: 1.45 MIU/ML (ref 0.36–3.74)
VIT D+METAB SERPL-MCNC: 53.1 NG/ML (ref 30–100)
VLDLC SERPL CALC-MCNC: 21 MG/DL (ref 0–30)
WBC # BLD AUTO: 4.2 X10(3) UL (ref 4–11)

## 2023-10-04 PROCEDURE — 83036 HEMOGLOBIN GLYCOSYLATED A1C: CPT | Performed by: FAMILY MEDICINE

## 2023-10-04 PROCEDURE — 80061 LIPID PANEL: CPT | Performed by: FAMILY MEDICINE

## 2023-10-04 PROCEDURE — 80050 GENERAL HEALTH PANEL: CPT | Performed by: FAMILY MEDICINE

## 2023-10-04 PROCEDURE — 82306 VITAMIN D 25 HYDROXY: CPT | Performed by: FAMILY MEDICINE

## 2023-10-05 ENCOUNTER — OFFICE VISIT (OUTPATIENT)
Dept: FAMILY MEDICINE CLINIC | Facility: CLINIC | Age: 53
End: 2023-10-05
Payer: COMMERCIAL

## 2023-10-05 ENCOUNTER — PATIENT MESSAGE (OUTPATIENT)
Dept: FAMILY MEDICINE CLINIC | Facility: CLINIC | Age: 53
End: 2023-10-05

## 2023-10-05 VITALS
RESPIRATION RATE: 16 BRPM | OXYGEN SATURATION: 100 % | HEART RATE: 76 BPM | HEIGHT: 64 IN | SYSTOLIC BLOOD PRESSURE: 110 MMHG | BODY MASS INDEX: 26 KG/M2 | DIASTOLIC BLOOD PRESSURE: 80 MMHG

## 2023-10-05 DIAGNOSIS — H60.312 ACUTE DIFFUSE OTITIS EXTERNA OF LEFT EAR: Primary | ICD-10-CM

## 2023-10-05 DIAGNOSIS — E78.2 MIXED HYPERLIPIDEMIA: ICD-10-CM

## 2023-10-05 LAB
EST. AVERAGE GLUCOSE BLD GHB EST-MCNC: 140 MG/DL (ref 68–126)
HBA1C MFR BLD: 6.5 % (ref ?–5.7)

## 2023-10-05 PROCEDURE — 3079F DIAST BP 80-89 MM HG: CPT | Performed by: FAMILY MEDICINE

## 2023-10-05 PROCEDURE — 99214 OFFICE O/P EST MOD 30 MIN: CPT | Performed by: FAMILY MEDICINE

## 2023-10-05 PROCEDURE — 3074F SYST BP LT 130 MM HG: CPT | Performed by: FAMILY MEDICINE

## 2023-10-05 RX ORDER — CEFDINIR 300 MG/1
300 CAPSULE ORAL 2 TIMES DAILY
Qty: 14 CAPSULE | Refills: 0 | Status: SHIPPED | OUTPATIENT
Start: 2023-10-05

## 2023-10-05 RX ORDER — CIPROFLOXACIN AND DEXAMETHASONE 3; 1 MG/ML; MG/ML
SUSPENSION/ DROPS AURICULAR (OTIC)
Qty: 7.5 ML | Refills: 0 | Status: SHIPPED | OUTPATIENT
Start: 2023-10-05

## 2023-10-05 NOTE — TELEPHONE ENCOUNTER
Pt states she was expecting an oral antibiotic to be sent to pharmacy. Only ear drops were sent. Please advise, thanks.

## 2023-10-05 NOTE — TELEPHONE ENCOUNTER
From: Jimena Saab  To: Suann Blizzard  Sent: 10/5/2023 4:44 AM CDT  Subject: Labs and ear pain    Hi  I did not see my aic results and i asked the phlebotomist to b sure it is in the order she states yes?   Also ear drops dr Ines Mcfadden ordered has not come in it was irdered by pharmacy so carmelo been using the ones ausra ordered in past  However the pain now on both ears is still there ,left worst than rt  I am flying to [place] on saturday and i want to be sure my ears are ok  Please help ty

## 2023-10-05 NOTE — PATIENT INSTRUCTIONS
ENT group:      Dr. Ernesto Claire [address] [facility]  [address],   [address]  [place] 6/15/22)   Rafi, [address] [place] [address]          [phone]. Health Maintenance: Controlling Cholesterol   What is cholesterol? Cholesterol is a fatty substance. It has both good and bad effects on the body. Your body needs small amounts of cholesterol to make hormones and to build and maintain nerve cells. However, when your body has too much cholesterol, deposits of fat called plaque form inside blood vessel walls. The blood vessel walls thicken and the vessels become narrower (a condition called atherosclerosis). This change in the blood vessels reduces blood flow through the blood vessels, possibly leading to heart attacks or strokes. Most of the cholesterol in your blood is made by your liver from the fats, carbohydrates, and proteins you eat. You also get cholesterol by eating animal products such as meat, eggs, and dairy products. It is important to find out what your cholesterol numbers are because lowering cholesterol levels that are too high lessens your risk for developing heart disease. It reduces the chance of a heart attack or death from heart disease, even if you already have heart disease. How is cholesterol measured? When you get your cholesterol checked, your healthcare provider will give you a number for your total cholesterol level. You can use the chart below to see if your total cholesterol is high.       Total Cholesterol Level (mg/dL)   ----------------------------------------  less than 200   good   200 to 239      borderline high   240 or above    high   ----------------------------------------  When your cholesterol is measured and found to be high, your healthcare provider may also check the amount of LDL (low-density lipoprotein) and HDL (high-density lipoprotein) in your blood. LDL and HDL carry cholesterol through your blood. LDL carries a lot of cholesterol, leaves behind fatty deposits on your artery walls, and contributes to heart disease. HDL does the opposite. HDL cleans the artery walls and removes extra cholesterol from the body, thus lowering the risk of heart disease. LDL cholesterol is called bad cholesterol. (You can think of \"L\" for \"lousy\" cholesterol.) HDL cholesterol is called good cholesterol (think of \"H\" for \"healthy\" cholesterol). It is good to have low levels of LDL and high levels of HDL. Because HDL cholesterol protects against heart disease, higher numbers are better. A level equal to or less than 40 mg/dL is low and is considered a major risk factor because it increases your risk for developing heart disease. HDL levels of 60 mg/dL or more help to lower your risk for heart disease. The level of LDL cholesterol that is healthy for you depends on your risk of heart disease and heart attack. In general, the higher your LDL level and the more risk factors you have for heart disease, the greater your chances of developing heart disease or having a heart attack. These are the recommended goals for LDL, according to risk level: The goal is less than 160 mg/dL if your risk of heart disease is low. The goal is less than 130 mg/dL if you have a moderate risk. The goal is less than 100 mg/dL if you have a high risk of heart disease or you already have heart disease or diabetes. In addition to high levels of total cholesterol and LDL, major risks for heart disease include:   diabetes   cigarette smoking   high blood pressure (140/90 mm Hg or higher or you are taking blood pressure medicine)   low HDL cholesterol (less than 40 mg/dL)   family history of early heart disease (father or brother diagnosed with heart disease before age 54, or mother or sister diagnosed before age 72)   your age: 39 or older for men and 54 or older for women. If you have diabetes, your risk of heart disease is high. If you do not have diabetes but you have 2 or more of the other risk factors in this list, your risk is moderate to high. Based on your personal and family history, your healthcare provider can help you calculate your risk level. How can I control my cholesterol level? Eating right, exercising, and not smoking can often control cholesterol levels. If you have a high risk for heart disease, your healthcare provider may prescribe cholesterol-lowering medicine as well as changes in lifestyle. Follow these diet guidelines to help control your cholesterol:   Limit the cholesterol in your diet to less than 300 mg per day. If you have heart disease, limit cholesterol to less than 200 mg per day. Be careful about the amounts and types of fat that you eat. Fats should contribute no more than 20 to 35% of your daily calories. Less than 7 to 10% of your calories should come from saturated fat. Some kinds of fats are better than others. Most of your dietary fat should be from polyunsaturated and monounsaturated fats. These 2 types of fats are healthier than saturated fats. Polyunsaturated fats are found in fish and some vegetable oils. Monounsaturated fats are found in olive oil, canola oil, and avocados. Both types of these healthier fats are also found in many nuts and legumes. Saturated fat raises your blood cholesterol because it makes it hard for the body to clear the cholesterol away. Saturated fat is found in different amounts in almost all foods. Butter, some oils, meat, and poultry fat contain a lot of saturated fat. Trans fatty acids, often called trans fats, are another type of fat in some foods. Trans fats tend to raise your bad LDL cholesterol and lower your good HDL cholesterol. Trans fats naturally occur in some foods, mostly in meat and dairy products. But food makers can create trans fats when they are preparing food for grocery stores. This is usually done by adding hydrogen to fats. If the list of ingredients of a food product includes the words \"partially hydrogenated\" (usually referring to oils, such as soybean oil and others), the product is likely to contain trans fats. Try to eat as little trans fat as possible. As of January 2006, nutrition labels must list trans fats if the food contains them. Check the nutrition bar on the side of the package. Adjust the amount of calories you eat and exercise regularly to maintain your recommended body weight. To control the cholesterol and types and amounts of fat you eat:   Check food labels for fat and cholesterol content. Choose the foods with less fat per serving. Limit the amount of butter and margarine you eat. Use sunflower, safflower, soybean, canola, corn, or olive oil. Avoid tropical oils such as palm or coconut oil. Also avoid oils that have been hydrogenated or partially hydrogenated. Use salad dressings and margarine made with polyunsaturated and monounsaturated fats. Use egg whites or egg substitutes rather than whole eggs. Replace whole-milk dairy products with nonfat or low-fat milk, cheese, spreads, and yogurt. Eat skinless chicken, turkey, fish, and meatless entrees more often than red meat. Choose lean cuts of meat and trim off all visible fat. Keep portion sizes moderate. Avoid fatty desserts such as ice cream, cream-filled cakes, and cheesecakes. Choose fresh fruits, nonfat frozen yogurt, Popsicles, etc.   Reduce the amount of fried foods, vending machine food, and fast food you eat. Eat several daily servings of fruits and vegetables (especially fresh fruits and leafy vegetables), beans, and whole grains (such as whole wheat, bran, brown rice, oats, and oatmeal). The fiber in these foods helps lower cholesterol. Eat 4 to 5 servings of nuts a week.  Examples of nuts that can be a part of a healthy diet are walnuts, almonds, hazelnuts, peanuts, pecans, and pistachio nuts. Look for low-fat or nonfat varieties of the foods you like to eat, or look for substitutes. Exercise goes hand-in-hand with a healthy diet for controlling cholesterol. Exercise helps because it:   Keeps your weight down. Decreases your total cholesterol level. Decreases your LDL (bad cholesterol). Increases your HDL (good cholesterol). A good exercise program includes aerobic exercise. Aerobic exercise is any activity that keeps your heart rate up (such as swimming, jogging, walking, and bicycling). You should get at least 30 minutes of moderate aerobic exercise most days of the week. Moderate aerobic exercise is generally defined as requiring the energy it takes to walk 2 miles in 30 minutes. You may need to exercise 60 minutes a day to prevent weight gain and 90 minutes a day to lose weight. If you haven't been exercising, ask your healthcare provider for an exercise prescription and start your new exercise program slowly. Do not smoke. Smoking increases your risk of heart disease because it lowers HDL levels. High cholesterol may run in families. Know your family history and discuss it with your healthcare provider. In summary, to control your cholesterol level:   Eat healthy. Get regular exercise. Don't smoke. Have your cholesterol levels checked as often as your provider recommends.

## 2023-10-06 ENCOUNTER — TELEPHONE (OUTPATIENT)
Dept: FAMILY MEDICINE CLINIC | Facility: CLINIC | Age: 53
End: 2023-10-06

## 2023-10-06 DIAGNOSIS — E11.9 NEW ONSET TYPE 2 DIABETES MELLITUS (HCC): Primary | ICD-10-CM

## 2023-10-06 NOTE — TELEPHONE ENCOUNTER
Pt notified of lab results & below orders. Referral for Yamini placed, phone # given to pt. Advised pt to start with low carb, low sugar, increase exercise. All questions answered, pt expresses understanding.

## 2023-10-06 NOTE — TELEPHONE ENCOUNTER
----- Message from Berlin Rodríguez MD sent at 10/5/2023  1:31 PM CDT -----  A1C is diabetic level now, pt can see Michael Mancera here for her accucheck, diet, I am surprised with  normal sugar pt has this.this is very mild though, no meds needed just diet.

## 2023-11-13 ENCOUNTER — PATIENT MESSAGE (OUTPATIENT)
Dept: FAMILY MEDICINE CLINIC | Facility: CLINIC | Age: 53
End: 2023-11-13

## 2023-11-14 RX ORDER — TIRZEPATIDE 2.5 MG/.5ML
2.5 INJECTION, SOLUTION SUBCUTANEOUS WEEKLY
Qty: 2 ML | Refills: 1 | Status: SHIPPED | OUTPATIENT
Start: 2023-11-14

## 2023-11-14 NOTE — TELEPHONE ENCOUNTER
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

## 2023-11-16 RX ORDER — LANCETS
EACH MISCELLANEOUS
Qty: 100 EACH | Refills: 3 | Status: SHIPPED | OUTPATIENT
Start: 2023-11-16

## 2023-11-16 RX ORDER — BLOOD SUGAR DIAGNOSTIC
STRIP MISCELLANEOUS
Qty: 100 STRIP | Refills: 3 | Status: SHIPPED | OUTPATIENT
Start: 2023-11-16 | End: 2024-11-15

## 2023-11-16 RX ORDER — BLOOD-GLUCOSE METER
1 EACH MISCELLANEOUS ONCE
Qty: 1 KIT | Refills: 0 | Status: SHIPPED | OUTPATIENT
Start: 2023-11-16 | End: 2023-11-16

## 2023-11-17 NOTE — TELEPHONE ENCOUNTER
Unable to complete PA via Epic or CMM. Received below message when attempting to complete via CMM:   To initiate the prior authorization process for this medication, please contact "[employer] Rx at [phone].

## 2023-11-21 ENCOUNTER — PATIENT MESSAGE (OUTPATIENT)
Dept: FAMILY MEDICINE CLINIC | Facility: CLINIC | Age: 53
End: 2023-11-21

## 2023-11-21 NOTE — TELEPHONE ENCOUNTER
From: Annie Hughes  To:  Marcin Washington  Sent: 11/21/2023 1:32 PM CST  Subject: Medication     Hi   Following up regarding the prior suth for mounjaro  Accirding to [facility] they are waiting on info from dr before they can fill this medication    Thank you  Please lmk and i will call [facility] again    Ty

## 2023-12-06 ENCOUNTER — TELEPHONE (OUTPATIENT)
Dept: FAMILY MEDICINE CLINIC | Facility: CLINIC | Age: 53
End: 2023-12-06

## 2024-01-31 ENCOUNTER — OFFICE VISIT (OUTPATIENT)
Dept: FAMILY MEDICINE CLINIC | Facility: CLINIC | Age: 54
End: 2024-01-31
Payer: COMMERCIAL

## 2024-01-31 VITALS
WEIGHT: 152 LBS | OXYGEN SATURATION: 97 % | BODY MASS INDEX: 25.95 KG/M2 | DIASTOLIC BLOOD PRESSURE: 72 MMHG | HEIGHT: 64 IN | RESPIRATION RATE: 18 BRPM | SYSTOLIC BLOOD PRESSURE: 116 MMHG | HEART RATE: 71 BPM

## 2024-01-31 DIAGNOSIS — Z00.00 ROUTINE GENERAL MEDICAL EXAMINATION AT A HEALTH CARE FACILITY: Primary | ICD-10-CM

## 2024-01-31 DIAGNOSIS — E11.9 NEW ONSET TYPE 2 DIABETES MELLITUS (HCC): ICD-10-CM

## 2024-01-31 PROCEDURE — 3078F DIAST BP <80 MM HG: CPT | Performed by: FAMILY MEDICINE

## 2024-01-31 PROCEDURE — 3074F SYST BP LT 130 MM HG: CPT | Performed by: FAMILY MEDICINE

## 2024-01-31 PROCEDURE — 99214 OFFICE O/P EST MOD 30 MIN: CPT | Performed by: FAMILY MEDICINE

## 2024-01-31 PROCEDURE — 3008F BODY MASS INDEX DOCD: CPT | Performed by: FAMILY MEDICINE

## 2024-01-31 NOTE — PROGRESS NOTES
CC: DM      HPI:    Pt presents for new onset of  DM. Pt was told before she has prediabetes (years ago), never checked her BG at home,  Pt has not been checking her feet on a regular basis. Pt denies any tingling of the feet. Pt denies any issues with depression. Pt denies increase thirst, denies increase apatite or urination frequency, has no acne problems, no change of diet recently.    Wt Readings from Last 6 Encounters:   01/31/24 152 lb (68.9 kg)   09/30/23 151 lb (68.5 kg)   08/14/23 161 lb (73 kg)   05/31/23 161 lb (73 kg)   04/24/23 156 lb (70.8 kg)   02/15/23 155 lb (70.3 kg)       HgbA1C (%)   Date Value   10/04/2023 6.5 (H)   07/28/2022 6.0 (H)     Cholesterol, Total (mg/dL)   Date Value   10/04/2023 243 (H)   07/28/2022 230 (H)   02/16/2022 239 (H)     HDL Cholesterol (mg/dL)   Date Value   10/04/2023 56   07/28/2022 59   02/16/2022 47     LDL Cholesterol (mg/dL)   Date Value   10/04/2023 168 (H)   07/28/2022 155 (H)   02/16/2022 137 (H)     AST (U/L)   Date Value   10/04/2023 29   10/08/2022 29   07/28/2022 27     ALT (U/L)   Date Value   10/04/2023 55   10/08/2022 53   07/28/2022 44        Current Outpatient Medications   Medication Sig Dispense Refill    Glucose Blood (ACCU-CHEK GUIDE) In Vitro Strip Use to test blood sugar once daily 100 strip 3    Accu-Chek Softclix Lancets Does not apply Misc Use to test blood sugar once daily. 100 each 3    ciprofloxacin-dexamethasone (CIPRODEX) 0.3-0.1 % Otic Suspension BID to the affected ear two drops. 7.5 mL 0    Hydrocortisone-Acetic Acid 1-2 % Otic Solution Place 3 drops in ear(s) 3 (three) times daily. 10 mL 0    losartan 50 MG Oral Tab Take 1 tablet (50 mg total) by mouth daily. 90 tablet 3    Loratadine 10 MG Oral Cap       Multiple Vitamins-Minerals (MULTIVITAMIN ADULT, MINERALS,) Oral Tab       montelukast 10 MG Oral Tab Take 1 tablet (10 mg total) by mouth nightly. 90 tablet 3    pantoprazole 40 MG Oral Tab EC Take 1 tablet (40 mg total) by mouth  every morning before breakfast. 90 tablet 3    aspirin 81 MG Oral Tab EC Take 1 tablet (81 mg total) by mouth daily. 90 tablet 3      Past Medical History:   Diagnosis Date    Unspecified essential hypertension       Past Surgical History:   Procedure Laterality Date          TUBAL LIGATION        Social History: Smoking:  Denies  Exercise: three times per week.  Diet: watches sugar closely     REVIEW OF SYSTEMS:   GENERAL HEALTH: feels well otherwise  EYE: normal vision  SKIN: denies any unusual skin lesions or rashes, no feet wounds.  RESPIRATORY: denies shortness of breath with exertion or on rest  CARDIOVASCULAR: denies chest pain on exertion or on rest, no palpitations,no orthopnea  GI: denies abdominal pain and denies heartburn, nausea, no polyphagia.  URO: no polyuria.  NEURO: denies headaches, no numbness or tingling.  PSYCH:denies depression    EXAM:   /72 (BP Location: Right arm)   Pulse 71   Resp 18   Ht 5' 4\" (1.626 m)   Wt 152 lb (68.9 kg)   SpO2 97%   BMI 26.09 kg/m²   Vital Signs: As above.   General: WD/WN in no acute distress.    HEENT: is unremarkable, PERRLA and EOMI. No carotid bruits. No thyromegaly or masses.   Heart: Regular rate and rhythm. S1, S2 no murmurs.   Lungs: Clear to auscultation bilaterally, with no wheeze, rhonchi, or rales.    Abdomen: soft, NT/ND no HSM and NABS.   Extremities: symmetric no abnormalities and normal strength.  No cyanosis, clubbing or edema.   Vascular: TP and DP 2+ katrin.  Skin: is unremarkable with no rashes.    Neuro: no focal abnormalities, and reflexes coordination and gait normal and symmetric.Bilateral barefoot skin diabetic exam is normal, visualized feet and the appearance is normal.  Bilateral monofilament/sensation of both feet is normal.  Pulsation pedal pulse exam of both lower legs/feet is normal as well.  Psych:depression screening negative.        ASSESSMENT AND PLAN:   Teri Jones is a 53 year old female who presents for:  [facility]  onset.Pt was educated about accu checks, diabetic diet, feet care, complications, need to see eye doctor once a year, importance of physical activities, labs.30 minutes counseling.  Rx Rybelsus smaples given.  Orders Placed This Encounter   Procedures    CBC With Differential With Platelet     Standing Status:   Future     Standing Expiration Date:   1/30/2025    Comp Metabolic Panel (14)     Standing Status:   Future     Standing Expiration Date:   1/30/2025    Lipid Panel     Standing Status:   Future     Standing Expiration Date:   1/30/2025    TSH W Reflex To Free T4     Standing Status:   Future     Standing Expiration Date:   1/30/2025    Vitamin D     Standing Status:   Future     Standing Expiration Date:   1/30/2025     Order Specific Question:   Please pick the scenario that best fits the purpose for ordering this test     Answer:   General Screening/Vit D deficiency (25-Hydroxy)     Order Specific Question:   Release to patient     Answer:   Immediate    Hemoglobin A1C     Standing Status:   Future     Standing Expiration Date:   1/30/2025    Microalb/Creat Ratio, Random Urine     Standing Status:   Future     Standing Expiration Date:   1/30/2025       F/u in 3 months.

## 2024-02-02 RX ORDER — CIPROFLOXACIN AND DEXAMETHASONE 3; 1 MG/ML; MG/ML
SUSPENSION/ DROPS AURICULAR (OTIC)
Qty: 7.5 ML | Refills: 0 | Status: SHIPPED | OUTPATIENT
Start: 2024-02-02

## 2024-02-02 NOTE — TELEPHONE ENCOUNTER
Medication(s) to Refill:   Requested Prescriptions     Pending Prescriptions Disp Refills    ciprofloxacin-dexamethasone (CIPRODEX) 0.3-0.1 % Otic Suspension 7.5 mL 0     Sig: BID to the affected ear two drops.         Reason for Medication Refill being sent to Provider / Reason Protocol Failed:  [] 90 day refill has already been granted  [] Blood Pressure out of range  [] Labs Abnormal/over due  [] Medication not previously prescribed by Provider  [x] Non-Protocol Medication  [] Controlled Substance   [] Due for appointment- no future appointment scheduled  [] No Follow up specified    Last Time Medication was Filled:  10/5/23    Last Office Visit with PCP: 1/31/24    When Patient was Due Back to the Office: 3 months      Future Appointments:  Future Appointments   Date Time Provider Department Center   2/14/2024  7:40 AM PF [facility]       Last Blood Pressures:  BP Readings from Last 2 Encounters:   01/31/24 116/72   10/05/23 110/80     Action taken:  [] Refill approved per protocol  [x] Routing to provider for approval

## 2024-02-14 ENCOUNTER — HOSPITAL ENCOUNTER (OUTPATIENT)
Dept: MAMMOGRAPHY | Age: 54
Discharge: HOME | End: 2024-02-14
Attending: FAMILY MEDICINE
Payer: COMMERCIAL

## 2024-02-14 DIAGNOSIS — Z12.31 ENCOUNTER FOR SCREENING MAMMOGRAM FOR MALIGNANT NEOPLASM OF BREAST: ICD-10-CM

## 2024-02-14 PROCEDURE — 77063 BREAST TOMOSYNTHESIS BI: CPT | Performed by: FAMILY MEDICINE

## 2024-02-14 PROCEDURE — 77067 SCR MAMMO BI INCL CAD: CPT | Performed by: FAMILY MEDICINE

## 2024-03-04 ENCOUNTER — HOSPITAL ENCOUNTER (OUTPATIENT)
Dept: MAMMOGRAPHY | Age: 54
Discharge: HOME | End: 2024-03-04
Attending: FAMILY MEDICINE
Payer: COMMERCIAL

## 2024-03-04 ENCOUNTER — HOSPITAL ENCOUNTER (OUTPATIENT)
Dept: ULTRASOUND IMAGING | Age: 54
Discharge: HOME | End: 2024-03-04
Attending: FAMILY MEDICINE
Payer: COMMERCIAL

## 2024-03-04 DIAGNOSIS — R92.2 INCONCLUSIVE MAMMOGRAM: ICD-10-CM

## 2024-03-04 PROCEDURE — 76642 ULTRASOUND BREAST LIMITED: CPT | Performed by: FAMILY MEDICINE

## 2024-03-04 PROCEDURE — 77065 DX MAMMO INCL CAD UNI: CPT | Performed by: FAMILY MEDICINE

## 2024-03-04 PROCEDURE — 77061 BREAST TOMOSYNTHESIS UNI: CPT | Performed by: FAMILY MEDICINE

## 2024-03-29 ENCOUNTER — PATIENT MESSAGE (OUTPATIENT)
Dept: FAMILY MEDICINE CLINIC | Facility: CLINIC | Age: 54
End: 2024-03-29

## 2024-04-01 NOTE — TELEPHONE ENCOUNTER
From: Teri Jones  To: Shanice Klein  Sent: 3/29/2024 2:57 PM CDT  Subject: Ear ache    Hi dr umana  Sorry its my ear again  Right ear   I thought i just popped it yesterday  Today its ringing and im habibg pain  Back of my right ear  I took 2 doses of ear gtts i have the ciprodex  But no relief  Some clear drainage  Kast 2 days none today  I took ibuprofen 400  And tylenol 500 earlier so i can work  Pain discomfort still there  Is there something i can take over the weekend?  I would greatly appreciate it    Ty and have a happy easter   Sorry i shouldve sent this sooner

## 2024-04-03 ENCOUNTER — OFFICE VISIT (OUTPATIENT)
Dept: FAMILY MEDICINE CLINIC | Facility: CLINIC | Age: 54
End: 2024-04-03
Payer: COMMERCIAL

## 2024-04-03 VITALS
RESPIRATION RATE: 16 BRPM | HEIGHT: 64 IN | HEART RATE: 80 BPM | SYSTOLIC BLOOD PRESSURE: 130 MMHG | BODY MASS INDEX: 25.27 KG/M2 | OXYGEN SATURATION: 98 % | WEIGHT: 148 LBS | DIASTOLIC BLOOD PRESSURE: 80 MMHG

## 2024-04-03 DIAGNOSIS — H65.93 BILATERAL NON-SUPPURATIVE OTITIS MEDIA: Primary | ICD-10-CM

## 2024-04-03 PROCEDURE — 99214 OFFICE O/P EST MOD 30 MIN: CPT | Performed by: NURSE PRACTITIONER

## 2024-04-03 PROCEDURE — 3075F SYST BP GE 130 - 139MM HG: CPT | Performed by: NURSE PRACTITIONER

## 2024-04-03 PROCEDURE — 3079F DIAST BP 80-89 MM HG: CPT | Performed by: NURSE PRACTITIONER

## 2024-04-03 PROCEDURE — 3008F BODY MASS INDEX DOCD: CPT | Performed by: NURSE PRACTITIONER

## 2024-04-03 RX ORDER — AMOXICILLIN AND CLAVULANATE POTASSIUM 875; 125 MG/1; MG/1
1 TABLET, FILM COATED ORAL 2 TIMES DAILY
Qty: 20 TABLET | Refills: 0 | Status: SHIPPED | OUTPATIENT
Start: 2024-04-03 | End: 2024-04-13

## 2024-04-03 RX ORDER — ORAL SEMAGLUTIDE 3 MG/1
TABLET ORAL
COMMUNITY
Start: 2024-02-01

## 2024-04-03 RX ORDER — CIPROFLOXACIN AND DEXAMETHASONE 3; 1 MG/ML; MG/ML
SUSPENSION/ DROPS AURICULAR (OTIC)
Qty: 7.5 ML | Refills: 0 | Status: SHIPPED | OUTPATIENT
Start: 2024-04-03

## 2024-04-03 NOTE — PROGRESS NOTES
Chief Complaint   Patient presents with    Ear Problem        Teri Jones is a 53 year old female who presents for  L and R ear fulness , pain . Dizziness -has been taking allergy medicine . Problem for  the last  5  days. Not worse or better, no sick contact. No ear discharge. No swelling of the ear.  No sinus congestion.            Current Outpatient Medications   Medication Sig Dispense Refill    Semaglutide (RYBELSUS) 3 MG Oral Tab       ciprofloxacin-dexamethasone (CIPRODEX) 0.3-0.1 % Otic Suspension BID to the affected ear two drops. 7.5 mL 0    Glucose Blood (ACCU-CHEK GUIDE) In Vitro Strip Use to test blood sugar once daily 100 strip 3    Accu-Chek Softclix Lancets Does not apply Misc Use to test blood sugar once daily. 100 each 3    Hydrocortisone-Acetic Acid 1-2 % Otic Solution Place 3 drops in ear(s) 3 (three) times daily. 10 mL 0    losartan 50 MG Oral Tab Take 1 tablet (50 mg total) by mouth daily. 90 tablet 3    Loratadine 10 MG Oral Cap       Multiple Vitamins-Minerals (MULTIVITAMIN ADULT, MINERALS,) Oral Tab       montelukast 10 MG Oral Tab Take 1 tablet (10 mg total) by mouth nightly. 90 tablet 3    pantoprazole 40 MG Oral Tab EC Take 1 tablet (40 mg total) by mouth every morning before breakfast. 90 tablet 3    aspirin 81 MG Oral Tab EC Take 1 tablet (81 mg total) by mouth daily. 90 tablet 3      Past Medical History:   Diagnosis Date    Unspecified essential hypertension       Past Surgical History:   Procedure Laterality Date          TUBAL LIGATION        Family History   Problem Relation Age of Onset    Other (Other) Father     Other (lung cancer) Father     Hypertension Mother     Diabetes Mother       Social History     Socioeconomic History    Marital status:    Tobacco Use    Smoking status: Former    Smokeless tobacco: Never   Vaping Use    Vaping Use: Never used   Substance and Sexual Activity    Alcohol use: Yes     Comment: social    Drug use: No   Other Topics Concern     Caffeine Concern Yes     Comment: coffee 3 daily    Exercise No    Seat Belt Yes         REVIEW OF SYSTEMS:   GENERAL: feels well otherwise, no fever, no chills.  SKIN: no rashes  EYES:denies any other cold symptoms, no stuffy nose, no tinnitus, no hearing issues.  HEENT: no cold symptoms, no sinus pressure, rhinorrhea.  LUNGS: denies shortness of breath with exertion, no cough.  GI: no nausea   NEURO: no headaches, no dizziness, no TMJ pain.      EXAM:   /80   Pulse 80   Resp 16   Ht 5' 4\" (1.626 m)   Wt 148 lb (67.1 kg)   SpO2 98%   BMI 25.40 kg/m²   GENERAL: well developed, well nourished,in no apparent distress  SKIN: no rashes,no suspicious lesions  EYES:PERRL, EOMI,Conjunctiva normal.  HEENT:  Head atraumatic, normocephalic, throat, oral mucosa are clear, no erythema. No sinus tenderness.  TM- bilateral:no erythema, mild bulging,no effusion. Hearing normal.  EAR canals bilaterally: noerythema,no tenderness, no discharge.  NECK: supple,no adenopathy  LUNGS: clear to auscultation bilaterally.  CARDIO: RRR without murmur      ASSESSMENT AND PLAN:   Teri Jones is a 53 year old female who presents with  Diagnoses and all orders for this visit:    Bilateral non-suppurative otitis media  -     amoxicillin clavulanate 875-125 MG Oral Tab; Take 1 tablet by mouth 2 (two) times daily for 10 days.  -     ciprofloxacin-dexamethasone (CIPRODEX) 0.3-0.1 % Otic Suspension; BID to the affected ear two drops.       The patient indicates understanding of these issues and agrees to the plan.  The patient is asked to return if sx's persist or worsen.

## 2024-06-15 ENCOUNTER — LAB ENCOUNTER (OUTPATIENT)
Dept: LAB | Age: 54
End: 2024-06-15
Attending: FAMILY MEDICINE
Payer: COMMERCIAL

## 2024-06-15 DIAGNOSIS — Z00.00 ROUTINE GENERAL MEDICAL EXAMINATION AT A HEALTH CARE FACILITY: ICD-10-CM

## 2024-06-15 DIAGNOSIS — E11.9 NEW ONSET TYPE 2 DIABETES MELLITUS (HCC): ICD-10-CM

## 2024-06-15 DIAGNOSIS — E78.2 MIXED HYPERLIPIDEMIA: ICD-10-CM

## 2024-06-15 LAB
ALBUMIN SERPL-MCNC: 4.2 G/DL (ref 3.4–5)
ALBUMIN/GLOB SERPL: 1.1 {RATIO} (ref 1–2)
ALP LIVER SERPL-CCNC: 71 U/L
ALT SERPL-CCNC: 30 U/L
ANION GAP SERPL CALC-SCNC: 4 MMOL/L (ref 0–18)
AST SERPL-CCNC: 20 U/L (ref 15–37)
BASOPHILS # BLD AUTO: 0.08 X10(3) UL (ref 0–0.2)
BASOPHILS NFR BLD AUTO: 1.9 %
BILIRUB SERPL-MCNC: 0.2 MG/DL (ref 0.1–2)
BUN BLD-MCNC: 14 MG/DL (ref 9–23)
CALCIUM BLD-MCNC: 9.7 MG/DL (ref 8.5–10.1)
CHLORIDE SERPL-SCNC: 101 MMOL/L (ref 98–112)
CHOLEST SERPL-MCNC: 163 MG/DL (ref ?–200)
CO2 SERPL-SCNC: 27 MMOL/L (ref 21–32)
CREAT BLD-MCNC: 0.68 MG/DL
CREAT UR-SCNC: 67.6 MG/DL
EGFRCR SERPLBLD CKD-EPI 2021: 103 ML/MIN/1.73M2 (ref 60–?)
EOSINOPHIL # BLD AUTO: 0.41 X10(3) UL (ref 0–0.7)
EOSINOPHIL NFR BLD AUTO: 9.8 %
ERYTHROCYTE [DISTWIDTH] IN BLOOD BY AUTOMATED COUNT: 12.6 %
EST. AVERAGE GLUCOSE BLD GHB EST-MCNC: 111 MG/DL (ref 68–126)
FASTING PATIENT LIPID ANSWER: YES
FASTING STATUS PATIENT QL REPORTED: YES
GLOBULIN PLAS-MCNC: 3.8 G/DL (ref 2.8–4.4)
GLUCOSE BLD-MCNC: 91 MG/DL (ref 70–99)
HBA1C MFR BLD: 5.5 % (ref ?–5.7)
HCT VFR BLD AUTO: 37.6 %
HDLC SERPL-MCNC: 48 MG/DL (ref 40–59)
HGB BLD-MCNC: 12.5 G/DL
IMM GRANULOCYTES # BLD AUTO: 0 X10(3) UL (ref 0–1)
IMM GRANULOCYTES NFR BLD: 0 %
LDLC SERPL CALC-MCNC: 101 MG/DL (ref ?–100)
LYMPHOCYTES # BLD AUTO: 1.05 X10(3) UL (ref 1–4)
LYMPHOCYTES NFR BLD AUTO: 25.1 %
MCH RBC QN AUTO: 29.8 PG (ref 26–34)
MCHC RBC AUTO-ENTMCNC: 33.2 G/DL (ref 31–37)
MCV RBC AUTO: 89.5 FL
MICROALBUMIN UR-MCNC: 0.94 MG/DL
MICROALBUMIN/CREAT 24H UR-RTO: 13.9 UG/MG (ref ?–30)
MONOCYTES # BLD AUTO: 0.29 X10(3) UL (ref 0.1–1)
MONOCYTES NFR BLD AUTO: 6.9 %
NEUTROPHILS # BLD AUTO: 2.35 X10 (3) UL (ref 1.5–7.7)
NEUTROPHILS # BLD AUTO: 2.35 X10(3) UL (ref 1.5–7.7)
NEUTROPHILS NFR BLD AUTO: 56.3 %
NONHDLC SERPL-MCNC: 115 MG/DL (ref ?–130)
OSMOLALITY SERPL CALC.SUM OF ELEC: 274 MOSM/KG (ref 275–295)
PLATELET # BLD AUTO: 257 10(3)UL (ref 150–450)
POTASSIUM SERPL-SCNC: 4.2 MMOL/L (ref 3.5–5.1)
PROT SERPL-MCNC: 8 G/DL (ref 6.4–8.2)
RBC # BLD AUTO: 4.2 X10(6)UL
SODIUM SERPL-SCNC: 132 MMOL/L (ref 136–145)
TRIGL SERPL-MCNC: 71 MG/DL (ref 30–149)
TSI SER-ACNC: 2.87 MIU/ML (ref 0.36–3.74)
VIT D+METAB SERPL-MCNC: 53.8 NG/ML (ref 30–100)
VLDLC SERPL CALC-MCNC: 12 MG/DL (ref 0–30)
WBC # BLD AUTO: 4.2 X10(3) UL (ref 4–11)

## 2024-06-15 PROCEDURE — 80050 GENERAL HEALTH PANEL: CPT | Performed by: FAMILY MEDICINE

## 2024-06-15 PROCEDURE — 80061 LIPID PANEL: CPT | Performed by: FAMILY MEDICINE

## 2024-06-15 PROCEDURE — 82570 ASSAY OF URINE CREATININE: CPT | Performed by: FAMILY MEDICINE

## 2024-06-15 PROCEDURE — 82306 VITAMIN D 25 HYDROXY: CPT | Performed by: FAMILY MEDICINE

## 2024-06-15 PROCEDURE — 82043 UR ALBUMIN QUANTITATIVE: CPT | Performed by: FAMILY MEDICINE

## 2024-06-15 PROCEDURE — 83036 HEMOGLOBIN GLYCOSYLATED A1C: CPT | Performed by: FAMILY MEDICINE

## 2024-06-17 ENCOUNTER — TELEPHONE (OUTPATIENT)
Dept: FAMILY MEDICINE CLINIC | Facility: CLINIC | Age: 54
End: 2024-06-17

## 2024-06-17 DIAGNOSIS — E87.0 SERUM SODIUM ELEVATED: Primary | ICD-10-CM

## 2024-06-18 ENCOUNTER — PATIENT MESSAGE (OUTPATIENT)
Dept: FAMILY MEDICINE CLINIC | Facility: CLINIC | Age: 54
End: 2024-06-18

## 2024-06-18 DIAGNOSIS — H65.93 BILATERAL NON-SUPPURATIVE OTITIS MEDIA: ICD-10-CM

## 2024-06-18 NOTE — TELEPHONE ENCOUNTER
From: Teri Jones  To: Shanice Ortiz  Sent: 6/18/2024 6:12 AM CDT  Subject: Wednesday appointment    Hi after my labs do i syill need to see dr ortiz?  My aic is better after 6mos of rybelsus samples recvd   I have an upcomimg wednesday appt with dr umana i kept watching for her availability  I would like to somehow keep this up and loose more lbs i only lost 5lbs ik 6 mos so please lmk if i would be qyalifued for the weight loss meds?  I am 144lbs and 5foot 4  My bmi is still overweight    Should i keep the wednesday appt with dr umana?

## 2024-06-19 ENCOUNTER — OFFICE VISIT (OUTPATIENT)
Dept: FAMILY MEDICINE CLINIC | Facility: CLINIC | Age: 54
End: 2024-06-19

## 2024-06-19 VITALS
WEIGHT: 151 LBS | RESPIRATION RATE: 16 BRPM | HEART RATE: 76 BPM | DIASTOLIC BLOOD PRESSURE: 80 MMHG | SYSTOLIC BLOOD PRESSURE: 128 MMHG | OXYGEN SATURATION: 98 % | HEIGHT: 64 IN | BODY MASS INDEX: 25.78 KG/M2

## 2024-06-19 DIAGNOSIS — E11.9 NEW ONSET TYPE 2 DIABETES MELLITUS (HCC): Primary | ICD-10-CM

## 2024-06-19 DIAGNOSIS — E66.3 OVERWEIGHT (BMI 25.0-29.9): ICD-10-CM

## 2024-06-19 PROCEDURE — 3061F NEG MICROALBUMINURIA REV: CPT | Performed by: FAMILY MEDICINE

## 2024-06-19 PROCEDURE — 3074F SYST BP LT 130 MM HG: CPT | Performed by: FAMILY MEDICINE

## 2024-06-19 PROCEDURE — 3079F DIAST BP 80-89 MM HG: CPT | Performed by: FAMILY MEDICINE

## 2024-06-19 PROCEDURE — 99214 OFFICE O/P EST MOD 30 MIN: CPT | Performed by: FAMILY MEDICINE

## 2024-06-19 PROCEDURE — 3008F BODY MASS INDEX DOCD: CPT | Performed by: FAMILY MEDICINE

## 2024-06-19 PROCEDURE — 3044F HG A1C LEVEL LT 7.0%: CPT | Performed by: FAMILY MEDICINE

## 2024-06-19 RX ORDER — ORAL SEMAGLUTIDE 7 MG/1
1 TABLET ORAL DAILY
Qty: 30 TABLET | Refills: 3 | Status: SHIPPED | OUTPATIENT
Start: 2024-06-19 | End: 2024-07-19

## 2024-06-19 RX ORDER — PHENTERMINE HYDROCHLORIDE 37.5 MG/1
37.5 TABLET ORAL
Qty: 30 TABLET | Refills: 1 | Status: SHIPPED | OUTPATIENT
Start: 2024-06-19

## 2024-06-20 DIAGNOSIS — H65.93 BILATERAL NON-SUPPURATIVE OTITIS MEDIA: ICD-10-CM

## 2024-06-20 RX ORDER — CIPROFLOXACIN AND DEXAMETHASONE 3; 1 MG/ML; MG/ML
SUSPENSION/ DROPS AURICULAR (OTIC)
Qty: 7.5 ML | Refills: 0 | OUTPATIENT
Start: 2024-06-20

## 2024-06-20 NOTE — PROGRESS NOTES
CC: DM, overweight      HPI:    Pt presents for new onset of  DM. Pt was told before she has prediabetes (years ago), never checked her BG at home,  Pt has not been checking her feet on a regular basis. Pt denies any tingling of the feet. Pt denies any issues with depression. Pt denies increase thirst, denies increase apatite or urination frequency, has no acne problems, no change of diet recently.Pt would like to loose weight.    Wt Readings from Last 6 Encounters:   06/19/24 151 lb (68.5 kg)   04/03/24 148 lb (67.1 kg)   01/31/24 152 lb (68.9 kg)   09/30/23 151 lb (68.5 kg)   08/14/23 161 lb (73 kg)   05/31/23 161 lb (73 kg)       HgbA1C (%)   Date Value   06/15/2024 5.5   10/04/2023 6.5 (H)   07/28/2022 6.0 (H)     Cholesterol, Total (mg/dL)   Date Value   06/15/2024 163   10/04/2023 243 (H)   07/28/2022 230 (H)     HDL Cholesterol (mg/dL)   Date Value   06/15/2024 48   10/04/2023 56   07/28/2022 59     LDL Cholesterol (mg/dL)   Date Value   06/15/2024 101 (H)   10/04/2023 168 (H)   07/28/2022 155 (H)     AST (U/L)   Date Value   06/15/2024 20   10/04/2023 29   10/08/2022 29     ALT (U/L)   Date Value   06/15/2024 30   10/04/2023 55   10/08/2022 53        Current Outpatient Medications   Medication Sig Dispense Refill    Phentermine HCl 37.5 MG Oral Tab Take 1 tablet (37.5 mg total) by mouth every morning before breakfast. 30 tablet 1    Semaglutide (RYBELSUS) 7 MG Oral Tab Take 1 tablet by mouth daily. 30 tablet 3    ciprofloxacin-dexamethasone (CIPRODEX) 0.3-0.1 % Otic Suspension BID to the affected ear two drops. 7.5 mL 0    Glucose Blood (ACCU-CHEK GUIDE) In Vitro Strip Use to test blood sugar once daily 100 strip 3    Accu-Chek Softclix Lancets Does not apply Misc Use to test blood sugar once daily. 100 each 3    losartan 50 MG Oral Tab Take 1 tablet (50 mg total) by mouth daily. 90 tablet 3    Loratadine 10 MG Oral Cap       Multiple Vitamins-Minerals (MULTIVITAMIN ADULT, MINERALS,) Oral Tab        montelukast 10 MG Oral Tab Take 1 tablet (10 mg total) by mouth nightly. 90 tablet 3    aspirin 81 MG Oral Tab EC Take 1 tablet (81 mg total) by mouth daily. 90 tablet 3    Semaglutide (RYBELSUS) 3 MG Oral Tab  (Patient not taking: Reported on 2024)      pantoprazole 40 MG Oral Tab EC Take 1 tablet (40 mg total) by mouth every morning before breakfast. (Patient not taking: Reported on 2024) 90 tablet 3      Past Medical History:    Unspecified essential hypertension      Past Surgical History:   Procedure Laterality Date          Tubal ligation        Social History: Smoking:  Denies  Exercise: three times per week.  Diet: watches sugar closely     REVIEW OF SYSTEMS:   GENERAL HEALTH: feels well otherwise  EYE: normal vision  SKIN: denies any unusual skin lesions or rashes, no feet wounds.  RESPIRATORY: denies shortness of breath with exertion or on rest  CARDIOVASCULAR: denies chest pain on exertion or on rest, no palpitations,no orthopnea  GI: denies abdominal pain and denies heartburn, nausea, no polyphagia.  URO: no polyuria.  NEURO: denies headaches, no numbness or tingling.  PSYCH:denies depression    EXAM:   /80   Pulse 76   Resp 16   Ht 5' 4\" (1.626 m)   Wt 151 lb (68.5 kg)   SpO2 98%   BMI 25.92 kg/m²   Vital Signs: As above.   General: WD/WN in no acute distress.    Heart: Regular rate and rhythm. S1, S2 no murmurs.   Lungs: Clear to auscultation bilaterally, with no wheeze, rhonchi, or rales.    Abdomen: soft, NT/ND no HSM and NABS.   Extremities: symmetric no abnormalities and normal strength.  No cyanosis, clubbing or edema.   Psych:depression screening negative.        ASSESSMENT AND PLAN:   Teri Jones is a 53 year old female who presents for:DM, overweight  New onset.Pt was educated about accu checks, diabetic diet, feet care, complications, need to see eye doctor once a year, importance of physical activities, labs.30 minutes counseling.   Discussed BMI target.  Discussed calorie counting, diet, exercise, and one pound of weight loss per week by decreasing calorie intake by 500 calories per day.Handout given.  . All side effects of Phentermine d/w the pt, heart problems possible  in the future, not known effect were explained.Exercise at least 3 times per week, 30min and low calories diet d/w the pt.Handouts given.  Pt understands all the directives.Patient understood above plan and agreed     Requested Prescriptions     Signed Prescriptions Disp Refills    Phentermine HCl 37.5 MG Oral Tab 30 tablet 1     Sig: Take 1 tablet (37.5 mg total) by mouth every morning before breakfast.    Semaglutide (RYBELSUS) 7 MG Oral Tab 30 tablet 3     Sig: Take 1 tablet by mouth daily.      F/u in 3 months.

## 2024-06-21 ENCOUNTER — TELEPHONE (OUTPATIENT)
Dept: FAMILY MEDICINE CLINIC | Facility: CLINIC | Age: 54
End: 2024-06-21

## 2024-06-21 RX ORDER — CIPROFLOXACIN AND DEXAMETHASONE 3; 1 MG/ML; MG/ML
SUSPENSION/ DROPS AURICULAR (OTIC)
Qty: 7.5 ML | Refills: 0 | Status: SHIPPED | OUTPATIENT
Start: 2024-06-21

## 2024-06-21 NOTE — TELEPHONE ENCOUNTER
Vicki called to get Phone # to initiate Rybelsus 7mg PA. [phone] [facility]. I called & spoke to manuel, she will fax over PA forms.  Will await forms.

## 2024-06-25 NOTE — TELEPHONE ENCOUNTER
Rybelsus 7mg PA form completed & faxed back to [facility], confirmation received. Will await insurance decision.

## 2024-06-28 NOTE — TELEPHONE ENCOUNTER
Nate arnold.  Does not meet pharmacy coverage guidelines.    Has to have an A1C greater than 7%.  Has to be taking metformin or a sulfonylurea, or a thiazolidinedione or a combination of these agents    Please advise

## 2024-07-01 ENCOUNTER — PATIENT MESSAGE (OUTPATIENT)
Dept: FAMILY MEDICINE CLINIC | Facility: CLINIC | Age: 54
End: 2024-07-01

## 2024-07-01 NOTE — TELEPHONE ENCOUNTER
From: Teri Jones  To: Shanice Klein  Sent: 7/1/2024 11:24 AM CDT  Subject: Info    Hi soledad  Here is a copy of our new insurance  Information effective today 7/1/2024  I called it in to the office today but just wanted to give u a copy  It is same pharmacy we go to  Os in [place] [address]  The pharmacy has not heard regardung rybelsus from old insurance  [employer]   If you can please resend the prior auth i will greatly appreciate  I will b calling the pharmacy as well to give them this new info  Thank you

## 2024-08-10 ENCOUNTER — TELEPHONE (OUTPATIENT)
Dept: FAMILY MEDICINE CLINIC | Facility: CLINIC | Age: 54
End: 2024-08-10

## 2024-08-10 NOTE — TELEPHONE ENCOUNTER
MD ON CALL NOTES    Patient complains of slight cough headache and fatigue.  Had fever up to 101 °F.  Tested positive for COVID today.  Otherwise feels well does not complain of shortness of breath or difficulty breathing.  Denies any pulmonary or cardiac conditions.  Patient is considering possibly getting treated with Paxlovid.  Offered e-visit.  Also recommended possibly going to the walk-in clinic or urgent care for further evaluation and possible Rx for Paxlovid.  Patient declines at this time states that she feels well. Pt is an RN.   ER precautions discussed.  Okay to continue with conservative management with over-the-counter counter cough suppressants and decongestants, and ibuprofen or Tylenol for fever or bodyaches.

## 2024-08-15 DIAGNOSIS — E66.3 OVERWEIGHT (BMI 25.0-29.9): ICD-10-CM

## 2024-08-15 RX ORDER — PHENTERMINE HYDROCHLORIDE 37.5 MG/1
37.5 TABLET ORAL
Qty: 30 TABLET | Refills: 0 | Status: SHIPPED | OUTPATIENT
Start: 2024-08-15

## 2024-08-16 ENCOUNTER — MED REC SCAN ONLY (OUTPATIENT)
Dept: FAMILY MEDICINE CLINIC | Facility: CLINIC | Age: 54
End: 2024-08-16

## 2024-08-28 ENCOUNTER — PATIENT MESSAGE (OUTPATIENT)
Dept: FAMILY MEDICINE CLINIC | Facility: CLINIC | Age: 54
End: 2024-08-28

## 2024-08-29 NOTE — TELEPHONE ENCOUNTER
From: Teri Jones  To: Shanice Klein  Sent: 8/28/2024 7:50 PM CDT  Subject: Shingles    Hi dr   I got the first dose end of 2023 i think sbd iforgot to get second dose.not sure what to do?  Do i start over again?  Ty please lmk

## 2024-08-29 NOTE — TELEPHONE ENCOUNTER
Patient received 1st dose of shingles vaccine on 9/30/23  She forgot to get 2nd dose  Asking what she should do  Please advise

## 2024-09-11 DIAGNOSIS — E66.3 OVERWEIGHT (BMI 25.0-29.9): ICD-10-CM

## 2024-09-11 RX ORDER — PHENTERMINE HYDROCHLORIDE 37.5 MG/1
37.5 TABLET ORAL
Qty: 30 TABLET | Refills: 0 | Status: SHIPPED | OUTPATIENT
Start: 2024-09-11

## 2024-09-15 DIAGNOSIS — H65.93 BILATERAL NON-SUPPURATIVE OTITIS MEDIA: ICD-10-CM

## 2024-09-16 RX ORDER — CIPROFLOXACIN AND DEXAMETHASONE 3; 1 MG/ML; MG/ML
SUSPENSION/ DROPS AURICULAR (OTIC)
Qty: 7.5 ML | Refills: 0 | Status: SHIPPED | OUTPATIENT
Start: 2024-09-16

## 2024-10-12 DIAGNOSIS — E66.3 OVERWEIGHT (BMI 25.0-29.9): ICD-10-CM

## 2024-10-12 DIAGNOSIS — E11.9 NEW ONSET TYPE 2 DIABETES MELLITUS (HCC): ICD-10-CM

## 2024-10-14 ENCOUNTER — TELEPHONE (OUTPATIENT)
Dept: FAMILY MEDICINE CLINIC | Facility: CLINIC | Age: 54
End: 2024-10-14

## 2024-10-14 RX ORDER — PHENTERMINE HYDROCHLORIDE 37.5 MG/1
37.5 TABLET ORAL
Qty: 30 TABLET | Refills: 0 | Status: SHIPPED | OUTPATIENT
Start: 2024-10-14

## 2024-10-14 RX ORDER — ORAL SEMAGLUTIDE 7 MG/1
1 TABLET ORAL DAILY
Qty: 30 TABLET | Refills: 0 | Status: SHIPPED | OUTPATIENT
Start: 2024-10-14 | End: 2024-11-13

## 2024-10-14 RX ORDER — LOSARTAN POTASSIUM 50 MG/1
50 TABLET ORAL DAILY
Qty: 90 TABLET | Refills: 0 | Status: SHIPPED | OUTPATIENT
Start: 2024-10-14

## 2024-10-29 ENCOUNTER — PATIENT MESSAGE (OUTPATIENT)
Dept: FAMILY MEDICINE CLINIC | Facility: CLINIC | Age: 54
End: 2024-10-29

## 2024-11-04 ENCOUNTER — PATIENT MESSAGE (OUTPATIENT)
Dept: FAMILY MEDICINE CLINIC | Facility: CLINIC | Age: 54
End: 2024-11-04

## 2024-11-06 NOTE — TELEPHONE ENCOUNTER
Patient requesting prescription for a non productive cough. Notes scratchy throat, denies fever. Took leftover benzonatate from a year ago. Please advise.   LOV 6/19/24

## 2024-11-08 RX ORDER — BENZONATATE 100 MG/1
100 CAPSULE ORAL 3 TIMES DAILY PRN
Qty: 30 CAPSULE | Refills: 0 | Status: SHIPPED | OUTPATIENT
Start: 2024-11-08

## 2024-11-14 DIAGNOSIS — E66.3 OVERWEIGHT (BMI 25.0-29.9): ICD-10-CM

## 2024-11-14 DIAGNOSIS — E11.9 NEW ONSET TYPE 2 DIABETES MELLITUS (HCC): ICD-10-CM

## 2024-11-14 RX ORDER — ORAL SEMAGLUTIDE 7 MG/1
1 TABLET ORAL DAILY
Qty: 30 TABLET | Refills: 0 | Status: SHIPPED | OUTPATIENT
Start: 2024-11-14 | End: 2024-12-14

## 2024-11-14 RX ORDER — PHENTERMINE HYDROCHLORIDE 37.5 MG/1
37.5 TABLET ORAL
Qty: 30 TABLET | Refills: 0 | Status: SHIPPED | OUTPATIENT
Start: 2024-11-14

## 2024-12-14 DIAGNOSIS — E11.9 NEW ONSET TYPE 2 DIABETES MELLITUS (HCC): ICD-10-CM

## 2024-12-14 DIAGNOSIS — E66.3 OVERWEIGHT (BMI 25.0-29.9): ICD-10-CM

## 2024-12-16 DIAGNOSIS — E66.3 OVERWEIGHT (BMI 25.0-29.9): ICD-10-CM

## 2024-12-16 RX ORDER — ORAL SEMAGLUTIDE 7 MG/1
1 TABLET ORAL DAILY
Qty: 30 TABLET | Refills: 0 | Status: SHIPPED | OUTPATIENT
Start: 2024-12-16 | End: 2025-01-15

## 2024-12-16 RX ORDER — PHENTERMINE HYDROCHLORIDE 37.5 MG/1
37.5 TABLET ORAL
Qty: 30 TABLET | Refills: 0 | OUTPATIENT
Start: 2024-12-16

## 2024-12-17 RX ORDER — PHENTERMINE HYDROCHLORIDE 37.5 MG/1
37.5 TABLET ORAL
Qty: 30 TABLET | Refills: 0 | Status: SHIPPED | OUTPATIENT
Start: 2024-12-17

## 2025-01-13 RX ORDER — LOSARTAN POTASSIUM 50 MG/1
50 TABLET ORAL DAILY
Qty: 90 TABLET | Refills: 0 | Status: SHIPPED | OUTPATIENT
Start: 2025-01-13

## 2025-01-15 DIAGNOSIS — E66.3 OVERWEIGHT (BMI 25.0-29.9): ICD-10-CM

## 2025-01-15 DIAGNOSIS — E11.9 NEW ONSET TYPE 2 DIABETES MELLITUS (HCC): ICD-10-CM

## 2025-01-15 RX ORDER — ORAL SEMAGLUTIDE 7 MG/1
1 TABLET ORAL DAILY
Qty: 30 TABLET | Refills: 0 | Status: SHIPPED | OUTPATIENT
Start: 2025-01-15 | End: 2025-02-14

## 2025-01-15 RX ORDER — PHENTERMINE HYDROCHLORIDE 37.5 MG/1
37.5 TABLET ORAL
Qty: 30 TABLET | Refills: 0 | Status: SHIPPED | OUTPATIENT
Start: 2025-01-15

## 2025-01-15 NOTE — TELEPHONE ENCOUNTER
Medication(s) to Refill:   Requested Prescriptions     Pending Prescriptions Disp Refills    Phentermine HCl 37.5 MG Oral Tab 30 tablet 0     Sig: Take 1 tablet (37.5 mg total) by mouth every morning before breakfast.         Reason for Medication Refill being sent to Provider / Reason Protocol Failed:  [] 90 day refill has already been granted  [] Blood Pressure out of range  [] Labs Abnormal/over due  [] Medication not previously prescribed by Provider  [] Non-Protocol Medication  [] Controlled Substance   [] Due for appointment- no future appointment scheduled  [] No Follow up specified      Last Time Medication was Filled:  12/17/24      Last Office Visit with PCP: 6/19/24    When Patient was Due Back to the Office:  3 months  (from when PCP last addressed condition)    Future Appointments:  No future appointments.      Last Blood Pressures:  BP Readings from Last 2 Encounters:   06/19/24 128/80   04/03/24 130/80         Action taken:  [] Refill approved per protocol  [] Routing to provider for approval

## 2025-01-15 NOTE — TELEPHONE ENCOUNTER
Medication(s) to Refill:   Requested Prescriptions     Pending Prescriptions Disp Refills    Semaglutide (RYBELSUS) 7 MG Oral Tab 30 tablet 0     Sig: Take 1 tablet by mouth daily.         Reason for Medication Refill being sent to Provider / Reason Protocol Failed:  [] 90 day refill has already been granted  [] Blood Pressure out of range  [] Labs Abnormal/over due  [] Medication not previously prescribed by Provider  [] Non-Protocol Medication  [] Controlled Substance   [] Due for appointment- no future appointment scheduled  [] No Follow up specified      Last Time Medication was Filled:  12/16/24      Last Office Visit with PCP: 6/19/24    When Patient was Due Back to the Office:  3 months  (from when PCP last addressed condition)    Future Appointments:  No future appointments.      Last Blood Pressures:  BP Readings from Last 2 Encounters:   06/19/24 128/80   04/03/24 130/80       Action taken:  [] Refill approved per protocol  [] Routing to provider for approval

## 2025-02-15 DIAGNOSIS — E11.9 NEW ONSET TYPE 2 DIABETES MELLITUS (HCC): ICD-10-CM

## 2025-02-15 DIAGNOSIS — E66.3 OVERWEIGHT (BMI 25.0-29.9): ICD-10-CM

## 2025-02-17 RX ORDER — PHENTERMINE HYDROCHLORIDE 37.5 MG/1
37.5 TABLET ORAL
Qty: 30 TABLET | Refills: 0 | OUTPATIENT
Start: 2025-02-17

## 2025-02-17 RX ORDER — ORAL SEMAGLUTIDE 7 MG/1
1 TABLET ORAL DAILY
Qty: 30 TABLET | Refills: 0 | OUTPATIENT
Start: 2025-02-17

## 2025-02-19 DIAGNOSIS — E66.3 OVERWEIGHT (BMI 25.0-29.9): ICD-10-CM

## 2025-02-20 ENCOUNTER — PATIENT MESSAGE (OUTPATIENT)
Dept: FAMILY MEDICINE CLINIC | Facility: CLINIC | Age: 55
End: 2025-02-20

## 2025-02-20 RX ORDER — PHENTERMINE HYDROCHLORIDE 37.5 MG/1
37.5 TABLET ORAL
Qty: 30 TABLET | Refills: 0 | Status: SHIPPED | OUTPATIENT
Start: 2025-02-20 | End: 2025-03-31

## 2025-02-20 RX ORDER — ORAL SEMAGLUTIDE 7 MG/1
1 TABLET ORAL DAILY
Qty: 30 TABLET | Refills: 0 | Status: CANCELLED
Start: 2025-02-20

## 2025-02-21 ENCOUNTER — PATIENT MESSAGE (OUTPATIENT)
Dept: FAMILY MEDICINE CLINIC | Facility: CLINIC | Age: 55
End: 2025-02-21

## 2025-02-21 RX ORDER — ORAL SEMAGLUTIDE 7 MG/1
1 TABLET ORAL DAILY
Qty: 30 TABLET | Refills: 0 | Status: SHIPPED | OUTPATIENT
Start: 2025-02-21

## 2025-02-21 NOTE — TELEPHONE ENCOUNTER
Medication(s) to Refill:   Requested Prescriptions     Pending Prescriptions Disp Refills    Semaglutide (RYBELSUS) 7 MG Oral Tab 30 tablet 0     Sig: Take 7 mg by mouth daily.         Reason for Medication Refill being sent to Provider / Reason Protocol Failed:  [] 90 day refill has already been granted  [] Blood Pressure out of range  [] Labs Abnormal/over due  [] Medication not previously prescribed by Provider  [] Non-Protocol Medication  [] Controlled Substance   [] Due for appointment- no future appointment scheduled  [] No Follow up specified      Last Time Medication was Filled:        Last Office Visit with PCP: 6/19/24    When Patient was Due Back to the Office:  3 months  (from when PCP last addressed condition)    Future Appointments:  Future Appointments   Date Time Provider Department Center   6/25/2025  8:00 AM Shanice Klein MD [facility] [place]         Last Blood Pressures:  BP Readings from Last 2 Encounters:   06/19/24 128/80   04/03/24 130/80         Action taken:  [] Refill approved per protocol  [] Routing to provider for approval

## 2025-02-24 ENCOUNTER — TELEPHONE (OUTPATIENT)
Dept: FAMILY MEDICINE CLINIC | Facility: CLINIC | Age: 55
End: 2025-02-24

## 2025-02-24 NOTE — TELEPHONE ENCOUNTER
Jackie Buckner, RN routed conversation to Em Triage Support3 hours ago (9:38 AM)     Jackie Buckner, RN3 hours ago (9:38 AM)     KJ  Patient states [employer] requires Prior Auth.     Routing to triage support for assistance.

## 2025-02-25 ENCOUNTER — OFFICE VISIT (OUTPATIENT)
Dept: FAMILY MEDICINE CLINIC | Facility: CLINIC | Age: 55
End: 2025-02-25
Payer: COMMERCIAL

## 2025-02-25 VITALS
SYSTOLIC BLOOD PRESSURE: 110 MMHG | HEIGHT: 64 IN | HEART RATE: 88 BPM | DIASTOLIC BLOOD PRESSURE: 80 MMHG | RESPIRATION RATE: 16 BRPM | WEIGHT: 146 LBS | BODY MASS INDEX: 24.92 KG/M2 | OXYGEN SATURATION: 98 %

## 2025-02-25 DIAGNOSIS — R73.03 PREDIABETES: ICD-10-CM

## 2025-02-25 DIAGNOSIS — M21.619 BUNION OF GREAT TOE: ICD-10-CM

## 2025-02-25 DIAGNOSIS — E11.9 TYPE 2 DIABETES MELLITUS WITHOUT COMPLICATION, WITHOUT LONG-TERM CURRENT USE OF INSULIN (HCC): Primary | ICD-10-CM

## 2025-02-25 PROCEDURE — 99214 OFFICE O/P EST MOD 30 MIN: CPT | Performed by: NURSE PRACTITIONER

## 2025-02-25 PROCEDURE — 3079F DIAST BP 80-89 MM HG: CPT | Performed by: NURSE PRACTITIONER

## 2025-02-25 PROCEDURE — 3008F BODY MASS INDEX DOCD: CPT | Performed by: NURSE PRACTITIONER

## 2025-02-25 PROCEDURE — 3074F SYST BP LT 130 MM HG: CPT | Performed by: NURSE PRACTITIONER

## 2025-02-25 RX ORDER — ORAL SEMAGLUTIDE 7 MG/1
1 TABLET ORAL DAILY
Qty: 30 TABLET | Refills: 5 | Status: SHIPPED | OUTPATIENT
Start: 2025-02-25

## 2025-02-25 NOTE — PROGRESS NOTES
HPI:   Teri Jones is a 54 year old female who presents for recheck of her diabetes and refills    Patient’s fasting blood sugars -doesn't check it   Last visit with ophthalmologist -never done    Pt has been checking her feet on a regular basis. Pt denies any tingling or numbness of the feet.   Pt denies any issues with depression.   .    Wt Readings from Last 6 Encounters:   02/25/25 146 lb (66.2 kg)   06/19/24 151 lb (68.5 kg)   04/03/24 148 lb (67.1 kg)   01/31/24 152 lb (68.9 kg)   09/30/23 151 lb (68.5 kg)   08/14/23 161 lb (73 kg)     Body mass index is 25.06 kg/m².     HgbA1C (%)   Date Value   06/15/2024 5.5   10/04/2023 6.5 (H)   07/28/2022 6.0 (H)     Cholesterol, Total (mg/dL)   Date Value   06/15/2024 163   10/04/2023 243 (H)   07/28/2022 230 (H)     HDL Cholesterol (mg/dL)   Date Value   06/15/2024 48   10/04/2023 56   07/28/2022 59     LDL Cholesterol (mg/dL)   Date Value   06/15/2024 101 (H)   10/04/2023 168 (H)   07/28/2022 155 (H)     AST (U/L)   Date Value   06/15/2024 20   10/04/2023 29   10/08/2022 29     ALT (U/L)   Date Value   06/15/2024 30   10/04/2023 55   10/08/2022 53      Malb/Cre Calc   Date Value Ref Range Status   06/15/2024 13.9 <=30.0 ug/mg Final     Comment:     <30 ug/mg creatinine       Normal     ug/mg creatinine   Microalbuminuria   >300 ug/mg creatinine      Albuminuria           Current Outpatient Medications   Medication Sig Dispense Refill    Semaglutide (RYBELSUS) 7 MG Oral Tab Take 1 tablet by mouth daily. 30 tablet 5    Phentermine HCl 37.5 MG Oral Tab Take 1 tablet (37.5 mg total) by mouth every morning before breakfast. 30 tablet 0    LOSARTAN 50 MG Oral Tab Take 1 tablet (50 mg total) by mouth daily. 90 tablet 0    benzonatate 100 MG Oral Cap Take 1 capsule (100 mg total) by mouth 3 (three) times daily as needed for cough. 30 capsule 0    Accu-Chek Softclix Lancets Does not apply Misc Use to test blood sugar once daily. 100 each 3    Loratadine 10 MG Oral  Cap       Multiple Vitamins-Minerals (MULTIVITAMIN ADULT, MINERALS,) Oral Tab       montelukast 10 MG Oral Tab Take 1 tablet (10 mg total) by mouth nightly. 90 tablet 3    pantoprazole 40 MG Oral Tab EC Take 1 tablet (40 mg total) by mouth every morning before breakfast. 90 tablet 3    aspirin 81 MG Oral Tab EC Take 1 tablet (81 mg total) by mouth daily. 90 tablet 3      Allergies[1]   Past Medical History:    Unspecified essential hypertension      Past Surgical History:   Procedure Laterality Date          Tubal ligation        Social History:   Social History     Socioeconomic History    Marital status:    Tobacco Use    Smoking status: Former    Smokeless tobacco: Never   Vaping Use    Vaping status: Never Used   Substance and Sexual Activity    Alcohol use: Yes     Comment: social    Drug use: No   Other Topics Concern    Caffeine Concern Yes     Comment: coffee 3 daily    Exercise No    Seat Belt Yes     Exercise: minimal.  Diet: watches minimally     REVIEW OF SYSTEMS:   GENERAL HEALTH: No fever/ chills. No fatigue.   SKIN: denies any unusual skin lesions or rashes  RESPIRATORY: denies shortness of breath with exertion, no cough.   CARDIOVASCULAR: denies chest pain on exertion.   GI: denies abdominal pain or heartburn.  No N/V/D/C.   NEURO: denies headaches or dizziness. Denies numbness/tingling     EXAM:   /80   Pulse 88   Resp 16   Ht 5' 4\" (1.626 m)   Wt 146 lb (66.2 kg)   SpO2 98%   BMI 25.06 kg/m²   GENERAL: well developed, well nourished, in no apparent distress  SKIN: no rashes, no suspicious lesions  HEENT: normocephalic/atraumatic, PERRLA, EOMs intact b/l. TMs pearly gray; not bulging or erythematous, throat without erythema or exudate.   NECK: supple, no adenopathy, no carotid bruits.  No thyromegaly.   LUNGS: CTA b/l, no WRR.  CARDIO: RRR without murmur  GI: BS x4, normoactive, no masses or HSM.  Soft without distention or tenderness.  EXTREMITIES: no cyanosis, clubbing or  edema; Shoes and socks were removed-- 2+ dorsalis pedis and posterior tibial pulses bilaterally. Bilateral feet examined for wounds or lesions-- none found.    NEURO: Sensation is intact to monofilament.      ASSESSMENT AND PLAN:     Diagnoses and all orders for this visit:    Type 2 diabetes mellitus without complication, without long-term current use of insulin (HCC)  -     Hemoglobin A1C; Future  -     Semaglutide (RYBELSUS) 7 MG Oral Tab; Take 1 tablet by mouth daily.  -     CBC With Differential With Platelet; Future  -     OPHTHALMOLOGY - EXTERNAL  -     Podiatry Referral - In Network    Prediabetes  -     Comp Metabolic Panel (14); Future  -     Lipid Panel; Future  -     Microalb/Creat Ratio, Random Urine; Future    Bunion of great toe        Check your blood sugars as directed   Keep track of your numbers and any changes in your eating or exercise patterns.  Call your healthcare provider if your blood sugar > 240 for longer than a day          Check your feet daily . Tell your healthcare provider if you have a cut or sore that does not heal.            [1] No Known Allergies

## 2025-03-01 ENCOUNTER — LAB ENCOUNTER (OUTPATIENT)
Dept: LAB | Age: 55
End: 2025-03-01
Attending: NURSE PRACTITIONER
Payer: COMMERCIAL

## 2025-03-01 DIAGNOSIS — E87.0 SERUM SODIUM ELEVATED: ICD-10-CM

## 2025-03-01 DIAGNOSIS — E11.9 TYPE 2 DIABETES MELLITUS WITHOUT COMPLICATION, WITHOUT LONG-TERM CURRENT USE OF INSULIN (HCC): ICD-10-CM

## 2025-03-01 DIAGNOSIS — R73.03 PREDIABETES: ICD-10-CM

## 2025-03-01 LAB
ALBUMIN SERPL-MCNC: 4.8 G/DL (ref 3.2–4.8)
ALBUMIN/GLOB SERPL: 1.7 {RATIO} (ref 1–2)
ALP LIVER SERPL-CCNC: 64 U/L
ALT SERPL-CCNC: 24 U/L
ANION GAP SERPL CALC-SCNC: 12 MMOL/L (ref 0–18)
AST SERPL-CCNC: 23 U/L (ref ?–34)
BASOPHILS # BLD AUTO: 0.07 X10(3) UL (ref 0–0.2)
BASOPHILS NFR BLD AUTO: 1.6 %
BILIRUB SERPL-MCNC: 0.2 MG/DL (ref 0.3–1.2)
BUN BLD-MCNC: 13 MG/DL (ref 9–23)
CALCIUM BLD-MCNC: 9.6 MG/DL (ref 8.7–10.6)
CHLORIDE SERPL-SCNC: 104 MMOL/L (ref 98–112)
CHOLEST SERPL-MCNC: 225 MG/DL (ref ?–200)
CO2 SERPL-SCNC: 24 MMOL/L (ref 21–32)
CREAT BLD-MCNC: 0.88 MG/DL
CREAT UR-SCNC: 131.6 MG/DL
EGFRCR SERPLBLD CKD-EPI 2021: 77 ML/MIN/1.73M2 (ref 60–?)
EOSINOPHIL # BLD AUTO: 0.12 X10(3) UL (ref 0–0.7)
EOSINOPHIL NFR BLD AUTO: 2.7 %
ERYTHROCYTE [DISTWIDTH] IN BLOOD BY AUTOMATED COUNT: 12.5 %
EST. AVERAGE GLUCOSE BLD GHB EST-MCNC: 117 MG/DL (ref 68–126)
FASTING PATIENT LIPID ANSWER: YES
FASTING STATUS PATIENT QL REPORTED: YES
GLOBULIN PLAS-MCNC: 2.9 G/DL (ref 2–3.5)
GLUCOSE BLD-MCNC: 86 MG/DL (ref 70–99)
HBA1C MFR BLD: 5.7 % (ref ?–5.7)
HCT VFR BLD AUTO: 37.6 %
HDLC SERPL-MCNC: 60 MG/DL (ref 40–59)
HGB BLD-MCNC: 12.4 G/DL
IMM GRANULOCYTES # BLD AUTO: 0.01 X10(3) UL (ref 0–1)
IMM GRANULOCYTES NFR BLD: 0.2 %
LDLC SERPL CALC-MCNC: 157 MG/DL (ref ?–100)
LYMPHOCYTES # BLD AUTO: 1.74 X10(3) UL (ref 1–4)
LYMPHOCYTES NFR BLD AUTO: 39.1 %
MCH RBC QN AUTO: 29.4 PG (ref 26–34)
MCHC RBC AUTO-ENTMCNC: 33 G/DL (ref 31–37)
MCV RBC AUTO: 89.1 FL
MICROALBUMIN UR-MCNC: 1.2 MG/DL
MICROALBUMIN/CREAT 24H UR-RTO: 9.1 UG/MG (ref ?–30)
MONOCYTES # BLD AUTO: 0.35 X10(3) UL (ref 0.1–1)
MONOCYTES NFR BLD AUTO: 7.9 %
NEUTROPHILS # BLD AUTO: 2.16 X10 (3) UL (ref 1.5–7.7)
NEUTROPHILS # BLD AUTO: 2.16 X10(3) UL (ref 1.5–7.7)
NEUTROPHILS NFR BLD AUTO: 48.5 %
NONHDLC SERPL-MCNC: 165 MG/DL (ref ?–130)
OSMOLALITY SERPL CALC.SUM OF ELEC: 289 MOSM/KG (ref 275–295)
PLATELET # BLD AUTO: 259 10(3)UL (ref 150–450)
POTASSIUM SERPL-SCNC: 4.5 MMOL/L (ref 3.5–5.1)
PROT SERPL-MCNC: 7.7 G/DL (ref 5.7–8.2)
RBC # BLD AUTO: 4.22 X10(6)UL
SODIUM SERPL-SCNC: 140 MMOL/L (ref 136–145)
TRIGL SERPL-MCNC: 46 MG/DL (ref 30–149)
VLDLC SERPL CALC-MCNC: 9 MG/DL (ref 0–30)
WBC # BLD AUTO: 4.5 X10(3) UL (ref 4–11)

## 2025-03-01 PROCEDURE — 83036 HEMOGLOBIN GLYCOSYLATED A1C: CPT

## 2025-03-01 PROCEDURE — 82570 ASSAY OF URINE CREATININE: CPT

## 2025-03-01 PROCEDURE — 82043 UR ALBUMIN QUANTITATIVE: CPT

## 2025-03-01 PROCEDURE — 36415 COLL VENOUS BLD VENIPUNCTURE: CPT

## 2025-03-01 PROCEDURE — 85025 COMPLETE CBC W/AUTO DIFF WBC: CPT

## 2025-03-01 PROCEDURE — 80053 COMPREHEN METABOLIC PANEL: CPT

## 2025-03-01 PROCEDURE — 80061 LIPID PANEL: CPT

## 2025-03-31 DIAGNOSIS — E11.9 TYPE 2 DIABETES MELLITUS WITHOUT COMPLICATION, WITHOUT LONG-TERM CURRENT USE OF INSULIN (HCC): ICD-10-CM

## 2025-03-31 DIAGNOSIS — E66.3 OVERWEIGHT (BMI 25.0-29.9): ICD-10-CM

## 2025-04-01 RX ORDER — PHENTERMINE HYDROCHLORIDE 37.5 MG/1
37.5 TABLET ORAL
Qty: 30 TABLET | Refills: 0 | Status: SHIPPED | OUTPATIENT
Start: 2025-04-01

## 2025-04-01 RX ORDER — ORAL SEMAGLUTIDE 7 MG/1
1 TABLET ORAL DAILY
Qty: 30 TABLET | Refills: 0 | Status: SHIPPED | OUTPATIENT
Start: 2025-04-01

## 2025-04-05 ENCOUNTER — PATIENT MESSAGE (OUTPATIENT)
Dept: FAMILY MEDICINE CLINIC | Facility: CLINIC | Age: 55
End: 2025-04-05

## 2025-04-10 RX ORDER — BETAMETHASONE DIPROPIONATE 0.5 MG/G
OINTMENT, AUGMENTED TOPICAL
Qty: 50 G | Refills: 1 | Status: SHIPPED | OUTPATIENT
Start: 2025-04-10

## 2025-04-17 RX ORDER — LOSARTAN POTASSIUM 50 MG/1
50 TABLET ORAL DAILY
Qty: 90 TABLET | Refills: 3 | Status: SHIPPED | OUTPATIENT
Start: 2025-04-17

## 2025-04-30 DIAGNOSIS — E66.3 OVERWEIGHT (BMI 25.0-29.9): ICD-10-CM

## 2025-05-02 RX ORDER — PHENTERMINE HYDROCHLORIDE 37.5 MG/1
37.5 TABLET ORAL
Qty: 30 TABLET | Refills: 0 | Status: SHIPPED | OUTPATIENT
Start: 2025-05-02

## 2025-05-02 NOTE — TELEPHONE ENCOUNTER
Please review.  Protocol Failed.    Phentermine Recent fills each # 30 : 1/16, 2/20, 4/1  Last prescription written: 4/1/25  Last office visit: 2/25/25    Future Appointments   Date Time Provider Department Center   6/25/2025  8:00 AM Shanice Klein MD [facility]         Requested Prescriptions   Pending Prescriptions Disp Refills    PHENTERMINE HCL 37.5 MG Oral Tab [Pharmacy Med Name: Phentermine Hcl 37.5 Mg Tab Kvkt] 30 tablet 0     Sig: Take 1 tablet (37.5 mg total) by mouth every morning before breakfast.       Controlled Substance Medication Failed - 5/2/2025 11:39 AM        Failed - This medication is a controlled substance - forward to provider to refill        Passed - Medication is active on med list

## 2025-05-03 DIAGNOSIS — E11.9 TYPE 2 DIABETES MELLITUS WITHOUT COMPLICATION, WITHOUT LONG-TERM CURRENT USE OF INSULIN (HCC): ICD-10-CM

## 2025-05-03 DIAGNOSIS — H66.90 ACUTE OTITIS MEDIA, UNSPECIFIED OTITIS MEDIA TYPE: ICD-10-CM

## 2025-05-05 ENCOUNTER — TELEPHONE (OUTPATIENT)
Dept: FAMILY MEDICINE CLINIC | Facility: CLINIC | Age: 55
End: 2025-05-05

## 2025-05-05 DIAGNOSIS — H66.90 ACUTE OTITIS MEDIA, UNSPECIFIED OTITIS MEDIA TYPE: ICD-10-CM

## 2025-05-05 RX ORDER — MONTELUKAST SODIUM 10 MG/1
10 TABLET ORAL NIGHTLY
Qty: 90 TABLET | Refills: 3 | Status: SHIPPED | OUTPATIENT
Start: 2025-05-05

## 2025-05-05 RX ORDER — MONTELUKAST SODIUM 10 MG/1
10 TABLET ORAL DAILY
Qty: 90 TABLET | Refills: 0 | OUTPATIENT
Start: 2025-05-05

## 2025-05-05 RX ORDER — ORAL SEMAGLUTIDE 7 MG/1
1 TABLET ORAL DAILY
Qty: 90 TABLET | Refills: 3 | Status: SHIPPED | OUTPATIENT
Start: 2025-05-05

## 2025-05-05 NOTE — TELEPHONE ENCOUNTER
Refill passes per [facility] protocol.    Future Appointments   Date Time Provider Department Center   6/25/2025  8:00 AM Shanice Klein MD [facility] [place]

## 2025-05-05 NOTE — TELEPHONE ENCOUNTER
Please review: medication fails/has no protocol attached.    Montelukast last written for patient 2/6/2023 (2 years ago)    Future Appointments   Date Time Provider Department Center   6/25/2025  8:00 AM Shanice Klein MD [facility] [place]

## 2025-05-05 NOTE — TELEPHONE ENCOUNTER
Denied    Note from payer: Your PA request has been denied.  Additional information will be provided in the denial communication. (Message 1140)  Payer: [facility] Case ID: [id number]    [phone]  Electronic appeal: Not supported  Appeal instructions: Your PA request has been denied.  Additional information will be provided in the denial communication. (Message 1140)  View History    Awaiting denial letter

## 2025-06-01 DIAGNOSIS — E66.3 OVERWEIGHT (BMI 25.0-29.9): ICD-10-CM

## 2025-06-02 ENCOUNTER — TELEPHONE (OUTPATIENT)
Dept: FAMILY MEDICINE CLINIC | Facility: CLINIC | Age: 55
End: 2025-06-02

## 2025-06-02 RX ORDER — PHENTERMINE HYDROCHLORIDE 37.5 MG/1
37.5 TABLET ORAL
Qty: 30 TABLET | Refills: 0 | Status: SHIPPED | OUTPATIENT
Start: 2025-06-02

## 2025-06-02 NOTE — TELEPHONE ENCOUNTER
05/02/2025  LAST WRITTEN: 05/02/2025  Quantity: 30    Recent Visits  Date Type Provider Dept   02/25/25 Office Visit Murali Aguero APRN [facility]   06/19/24 Office Visit Shanice Klein MD [facility]   04/03/24 Office Visit Murali Aguero APRN [facility]   01/31/24 Office Visit Shanice Klein MD [facility]   Showing recent visits within past 540 days with a meds authorizing provider and meeting all other requirements  Future Appointments  Date Type Provider Dept   06/25/25 Appointment Shanice Klein MD [facility]   Showing future appointments within next 150 days with a meds authorizing provider and meeting all other requirements

## 2025-06-03 NOTE — TELEPHONE ENCOUNTER
Denied    Note from payer: Your PA request has been denied.  Additional information will be provided in the denial communication. (Message 1149)  Payer: [employer] Case ID: [id number]    [phone]  Electronic appeal: Not supported  Appeal instructions: Your PA request has been denied.  Additional information will be provided in the denial communication. (Message 1140)  View History    Await official denial

## 2025-06-03 NOTE — TELEPHONE ENCOUNTER
Prior authorization for phentermine was done through [facility] [employer]. It can take up to 14 business days for a decision to come back

## 2025-06-30 DIAGNOSIS — E66.3 OVERWEIGHT (BMI 25.0-29.9): ICD-10-CM

## 2025-07-01 RX ORDER — PHENTERMINE HYDROCHLORIDE 37.5 MG/1
37.5 TABLET ORAL
Qty: 30 TABLET | Refills: 0 | Status: SHIPPED | OUTPATIENT
Start: 2025-07-04

## 2025-07-01 NOTE — TELEPHONE ENCOUNTER
Please review. Refill failed protocol.     Patient will be due for refill on 7/4/25, changed on pended prescription.   Recent fills each # 30 : 6/4/25 , 5/2/25 , 4/1/25  Last prescription written: 6/2/25  Last office visit:  Visit date not found    Future Appointments   Date Time Provider Department Center   8/11/2025  8:40 AM Shanice Klein MD [facility] [place]

## 2025-08-01 DIAGNOSIS — E66.3 OVERWEIGHT (BMI 25.0-29.9): ICD-10-CM

## 2025-08-04 RX ORDER — PHENTERMINE HYDROCHLORIDE 37.5 MG/1
37.5 TABLET ORAL
Qty: 30 TABLET | Refills: 0 | Status: SHIPPED | OUTPATIENT
Start: 2025-08-04

## (undated) NOTE — LETTER
12/13/17        VIVIEN/ [address]      Dear [facility] records indicate that you have outstanding lab work and or testing that was ordered for you and has not yet been completed:          CBC W/DIFF      LIPID PANE

## (undated) NOTE — LETTER
Date: 11/8/2019    Patient Name: Estefania Day          To Whom it may concern: The above patient was seen at the [facility] for treatment of a medical condition. This patient should be excused from attending work/school on 11/8/19.

## (undated) NOTE — LETTER
19    Patient: Sirisha Dawkins  : 1970 Visit date: 9/3/2019    Dear  Dr. Haider Dillon MD,    Thank you for referring Sirisha Dawkins to my practice. Please find my assessment and plan below.              Assessment   Encounter for screening co

## (undated) NOTE — LETTER
01/14/19        YESSICA Durbin [address]      Dear [facility] records indicate that you have outstanding lab work and or testing that was ordered for you and has not yet been completed:  Orders Placed This Encounter